# Patient Record
Sex: MALE | Race: WHITE | Employment: FULL TIME | URBAN - METROPOLITAN AREA
[De-identification: names, ages, dates, MRNs, and addresses within clinical notes are randomized per-mention and may not be internally consistent; named-entity substitution may affect disease eponyms.]

---

## 2020-02-21 ENCOUNTER — APPOINTMENT (OUTPATIENT)
Dept: CT IMAGING | Age: 30
End: 2020-02-21
Payer: COMMERCIAL

## 2020-02-21 ENCOUNTER — APPOINTMENT (OUTPATIENT)
Dept: GENERAL RADIOLOGY | Age: 30
End: 2020-02-21
Payer: COMMERCIAL

## 2020-02-21 ENCOUNTER — HOSPITAL ENCOUNTER (EMERGENCY)
Age: 30
Discharge: HOME | End: 2020-02-21
Attending: EMERGENCY MEDICINE
Payer: COMMERCIAL

## 2020-02-21 VITALS
HEIGHT: 70 IN | TEMPERATURE: 98.2 F | WEIGHT: 180 LBS | RESPIRATION RATE: 19 BRPM | HEART RATE: 119 BPM | OXYGEN SATURATION: 93 % | BODY MASS INDEX: 25.77 KG/M2 | SYSTOLIC BLOOD PRESSURE: 135 MMHG | DIASTOLIC BLOOD PRESSURE: 99 MMHG

## 2020-02-21 LAB
A/G RATIO: 1.7 (ref 1.1–2.2)
ALBUMIN SERPL-MCNC: 5.2 G/DL (ref 3.4–5)
ALP BLD-CCNC: 86 U/L (ref 40–129)
ALT SERPL-CCNC: 75 U/L (ref 10–40)
AMPHETAMINE SCREEN, URINE: NORMAL
ANION GAP SERPL CALCULATED.3IONS-SCNC: 23 MMOL/L (ref 3–16)
AST SERPL-CCNC: 74 U/L (ref 15–37)
BARBITURATE SCREEN URINE: NORMAL
BASOPHILS ABSOLUTE: 0 K/UL (ref 0–0.2)
BASOPHILS RELATIVE PERCENT: 0.8 %
BENZODIAZEPINE SCREEN, URINE: NORMAL
BILIRUB SERPL-MCNC: 0.5 MG/DL (ref 0–1)
BILIRUBIN URINE: NEGATIVE
BLOOD, URINE: NEGATIVE
BUN BLDV-MCNC: 9 MG/DL (ref 7–20)
CALCIUM SERPL-MCNC: 9.1 MG/DL (ref 8.3–10.6)
CANNABINOID SCREEN URINE: NORMAL
CHLORIDE BLD-SCNC: 93 MMOL/L (ref 99–110)
CLARITY: CLEAR
CO2: 22 MMOL/L (ref 21–32)
COCAINE METABOLITE SCREEN URINE: NORMAL
COLOR: ABNORMAL
CREAT SERPL-MCNC: 0.7 MG/DL (ref 0.9–1.3)
D DIMER: 309 NG/ML DDU (ref 0–229)
EKG ATRIAL RATE: 109 BPM
EKG DIAGNOSIS: NORMAL
EKG P AXIS: 45 DEGREES
EKG P-R INTERVAL: 158 MS
EKG Q-T INTERVAL: 352 MS
EKG QRS DURATION: 92 MS
EKG QTC CALCULATION (BAZETT): 474 MS
EKG R AXIS: 11 DEGREES
EKG T AXIS: 19 DEGREES
EKG VENTRICULAR RATE: 109 BPM
EOSINOPHILS ABSOLUTE: 0 K/UL (ref 0–0.6)
EOSINOPHILS RELATIVE PERCENT: 0.6 %
GFR AFRICAN AMERICAN: >60
GFR NON-AFRICAN AMERICAN: >60
GLOBULIN: 3.1 G/DL
GLUCOSE BLD-MCNC: 92 MG/DL (ref 70–99)
GLUCOSE URINE: NEGATIVE MG/DL
HCT VFR BLD CALC: 46.9 % (ref 40.5–52.5)
HEMOGLOBIN: 16 G/DL (ref 13.5–17.5)
KETONES, URINE: 15 MG/DL
LEUKOCYTE ESTERASE, URINE: NEGATIVE
LIPASE: 23 U/L (ref 13–60)
LYMPHOCYTES ABSOLUTE: 1.5 K/UL (ref 1–5.1)
LYMPHOCYTES RELATIVE PERCENT: 25.3 %
Lab: NORMAL
MAGNESIUM: 2.1 MG/DL (ref 1.8–2.4)
MCH RBC QN AUTO: 31 PG (ref 26–34)
MCHC RBC AUTO-ENTMCNC: 34.2 G/DL (ref 31–36)
MCV RBC AUTO: 90.6 FL (ref 80–100)
METHADONE SCREEN, URINE: NORMAL
MICROSCOPIC EXAMINATION: ABNORMAL
MONOCYTES ABSOLUTE: 0.2 K/UL (ref 0–1.3)
MONOCYTES RELATIVE PERCENT: 3.7 %
NEUTROPHILS ABSOLUTE: 4.1 K/UL (ref 1.7–7.7)
NEUTROPHILS RELATIVE PERCENT: 69.6 %
NITRITE, URINE: NEGATIVE
OPIATE SCREEN URINE: NORMAL
OXYCODONE URINE: NORMAL
PDW BLD-RTO: 13.1 % (ref 12.4–15.4)
PH UA: 6
PH UA: 6 (ref 5–8)
PHENCYCLIDINE SCREEN URINE: NORMAL
PLATELET # BLD: 274 K/UL (ref 135–450)
PMV BLD AUTO: 6.8 FL (ref 5–10.5)
POTASSIUM SERPL-SCNC: 4.4 MMOL/L (ref 3.5–5.1)
PROPOXYPHENE SCREEN: NORMAL
PROTEIN UA: NEGATIVE MG/DL
RAPID INFLUENZA  B AGN: NEGATIVE
RAPID INFLUENZA A AGN: NEGATIVE
RBC # BLD: 5.17 M/UL (ref 4.2–5.9)
SODIUM BLD-SCNC: 138 MMOL/L (ref 136–145)
SPECIFIC GRAVITY UA: <=1.005 (ref 1–1.03)
TOTAL PROTEIN: 8.3 G/DL (ref 6.4–8.2)
TROPONIN: <0.01 NG/ML
TSH REFLEX: 1.97 UIU/ML (ref 0.27–4.2)
URINE TYPE: ABNORMAL
UROBILINOGEN, URINE: 0.2 E.U./DL
WBC # BLD: 5.9 K/UL (ref 4–11)

## 2020-02-21 PROCEDURE — 2580000003 HC RX 258: Performed by: EMERGENCY MEDICINE

## 2020-02-21 PROCEDURE — 87804 INFLUENZA ASSAY W/OPTIC: CPT

## 2020-02-21 PROCEDURE — 99284 EMERGENCY DEPT VISIT MOD MDM: CPT

## 2020-02-21 PROCEDURE — 96361 HYDRATE IV INFUSION ADD-ON: CPT

## 2020-02-21 PROCEDURE — 83735 ASSAY OF MAGNESIUM: CPT

## 2020-02-21 PROCEDURE — 93005 ELECTROCARDIOGRAM TRACING: CPT | Performed by: EMERGENCY MEDICINE

## 2020-02-21 PROCEDURE — 83690 ASSAY OF LIPASE: CPT

## 2020-02-21 PROCEDURE — 6360000004 HC RX CONTRAST MEDICATION: Performed by: PHYSICIAN ASSISTANT

## 2020-02-21 PROCEDURE — 2580000003 HC RX 258: Performed by: PHYSICIAN ASSISTANT

## 2020-02-21 PROCEDURE — 71260 CT THORAX DX C+: CPT

## 2020-02-21 PROCEDURE — 85379 FIBRIN DEGRADATION QUANT: CPT

## 2020-02-21 PROCEDURE — 80053 COMPREHEN METABOLIC PANEL: CPT

## 2020-02-21 PROCEDURE — 96376 TX/PRO/DX INJ SAME DRUG ADON: CPT

## 2020-02-21 PROCEDURE — 36415 COLL VENOUS BLD VENIPUNCTURE: CPT

## 2020-02-21 PROCEDURE — 96375 TX/PRO/DX INJ NEW DRUG ADDON: CPT

## 2020-02-21 PROCEDURE — 81003 URINALYSIS AUTO W/O SCOPE: CPT

## 2020-02-21 PROCEDURE — 84484 ASSAY OF TROPONIN QUANT: CPT

## 2020-02-21 PROCEDURE — 96374 THER/PROPH/DIAG INJ IV PUSH: CPT

## 2020-02-21 PROCEDURE — 80307 DRUG TEST PRSMV CHEM ANLYZR: CPT

## 2020-02-21 PROCEDURE — 6360000002 HC RX W HCPCS: Performed by: PHYSICIAN ASSISTANT

## 2020-02-21 PROCEDURE — 85025 COMPLETE CBC W/AUTO DIFF WBC: CPT

## 2020-02-21 PROCEDURE — 6360000002 HC RX W HCPCS: Performed by: EMERGENCY MEDICINE

## 2020-02-21 PROCEDURE — 84443 ASSAY THYROID STIM HORMONE: CPT

## 2020-02-21 RX ORDER — DIPHENHYDRAMINE HYDROCHLORIDE 50 MG/ML
50 INJECTION INTRAMUSCULAR; INTRAVENOUS ONCE
Status: COMPLETED | OUTPATIENT
Start: 2020-02-21 | End: 2020-02-21

## 2020-02-21 RX ORDER — 0.9 % SODIUM CHLORIDE 0.9 %
1000 INTRAVENOUS SOLUTION INTRAVENOUS ONCE
Status: COMPLETED | OUTPATIENT
Start: 2020-02-21 | End: 2020-02-21

## 2020-02-21 RX ORDER — ONDANSETRON 2 MG/ML
4 INJECTION INTRAMUSCULAR; INTRAVENOUS ONCE
Status: COMPLETED | OUTPATIENT
Start: 2020-02-21 | End: 2020-02-21

## 2020-02-21 RX ORDER — METOCLOPRAMIDE HYDROCHLORIDE 5 MG/ML
10 INJECTION INTRAMUSCULAR; INTRAVENOUS ONCE
Status: COMPLETED | OUTPATIENT
Start: 2020-02-21 | End: 2020-02-21

## 2020-02-21 RX ORDER — KETOROLAC TROMETHAMINE 30 MG/ML
30 INJECTION, SOLUTION INTRAMUSCULAR; INTRAVENOUS ONCE
Status: COMPLETED | OUTPATIENT
Start: 2020-02-21 | End: 2020-02-21

## 2020-02-21 RX ORDER — LORAZEPAM 2 MG/ML
1 INJECTION INTRAMUSCULAR ONCE
Status: COMPLETED | OUTPATIENT
Start: 2020-02-21 | End: 2020-02-21

## 2020-02-21 RX ORDER — OMEPRAZOLE 20 MG/1
20 CAPSULE, DELAYED RELEASE ORAL DAILY
Status: ON HOLD | COMMUNITY
End: 2021-03-28

## 2020-02-21 RX ORDER — SODIUM CHLORIDE, SODIUM LACTATE, POTASSIUM CHLORIDE, CALCIUM CHLORIDE 600; 310; 30; 20 MG/100ML; MG/100ML; MG/100ML; MG/100ML
1000 INJECTION, SOLUTION INTRAVENOUS ONCE
Status: COMPLETED | OUTPATIENT
Start: 2020-02-21 | End: 2020-02-21

## 2020-02-21 RX ADMIN — SODIUM CHLORIDE 1000 ML: 9 INJECTION, SOLUTION INTRAVENOUS at 13:46

## 2020-02-21 RX ADMIN — METOCLOPRAMIDE HYDROCHLORIDE 10 MG: 5 INJECTION INTRAMUSCULAR; INTRAVENOUS at 15:46

## 2020-02-21 RX ADMIN — SODIUM CHLORIDE, POTASSIUM CHLORIDE, SODIUM LACTATE AND CALCIUM CHLORIDE 1000 ML: 600; 310; 30; 20 INJECTION, SOLUTION INTRAVENOUS at 19:42

## 2020-02-21 RX ADMIN — LORAZEPAM 1 MG: 2 INJECTION, SOLUTION INTRAMUSCULAR; INTRAVENOUS at 17:02

## 2020-02-21 RX ADMIN — LORAZEPAM 1 MG: 2 INJECTION, SOLUTION INTRAMUSCULAR; INTRAVENOUS at 19:09

## 2020-02-21 RX ADMIN — ONDANSETRON 4 MG: 2 INJECTION INTRAMUSCULAR; INTRAVENOUS at 13:46

## 2020-02-21 RX ADMIN — DIPHENHYDRAMINE HYDROCHLORIDE 50 MG: 50 INJECTION, SOLUTION INTRAMUSCULAR; INTRAVENOUS at 15:46

## 2020-02-21 RX ADMIN — IOPAMIDOL 150 ML: 755 INJECTION, SOLUTION INTRAVENOUS at 18:39

## 2020-02-21 RX ADMIN — SODIUM CHLORIDE 1000 ML: 9 INJECTION, SOLUTION INTRAVENOUS at 14:47

## 2020-02-21 RX ADMIN — KETOROLAC TROMETHAMINE 30 MG: 30 INJECTION, SOLUTION INTRAMUSCULAR at 15:46

## 2020-02-21 ASSESSMENT — PAIN DESCRIPTION - LOCATION: LOCATION: HEAD

## 2020-02-21 ASSESSMENT — PAIN DESCRIPTION - DESCRIPTORS: DESCRIPTORS: HEAVINESS

## 2020-02-21 ASSESSMENT — PAIN DESCRIPTION - PAIN TYPE: TYPE: ACUTE PAIN

## 2020-02-21 ASSESSMENT — PAIN SCALES - GENERAL
PAINLEVEL_OUTOF10: 7
PAINLEVEL_OUTOF10: 7

## 2020-02-22 NOTE — ED PROVIDER NOTES
[facility] Emergency Department    CHIEF COMPLAINT  Emesis (monday felt weird, tues started with migraine (hx couple migraine per year), vomiting since tuesday. went to urgent care today; HR was in low 100's and told to go to ED. was given 25mg IM phenergan at urgent care approx 30 min ago) and Headache (head just feels heavy now. feels anxious. )      HISTORY OF PRESENT ILLNESS  Guanakito Knowles is a 34 y.o. male who presents to the ED complaining of several day history of multiple complaints. Accompanied by wife today for evaluation. Patient reports starting with some nausea on Monday. Began with headache reports generalized on Tuesday. History of 1-2 migraines a year and states that this felt very consistent. Body aches and anxiety. Despite headaches has had no dizziness or confusion. Does occasionally feel lightheaded. No visual changes or disturbances. No difficulty speaking or swallowing. No nasal congestion or sore throat. Denies cough or congestion. No fevers chills. Non-smoker. No rashes. No neck, arm, jaw or back pain. No numbness, tingling, weakness of the extremities. Has had no urinary symptoms. No diarrhea or constipation. No leg pain or swelling. No recent travel, trauma or surgery. Seen at urgent care and recommended for ER evaluation given elevated heart rate. Again no pleuritic chest pain. He denies drug use. Does report drinking 2-3 bourbons daily. Has not had any alcoholic beverages since Monday. No other complaints, modifying factors or associated symptoms. Nursing notes reviewed. Past Medical History:   Diagnosis Date    GERD (gastroesophageal reflux disease)      Past Surgical History:   Procedure Laterality Date    SHOULDER SURGERY Right      History reviewed. No pertinent family history.   Social History     Socioeconomic History    Marital status:      Spouse name: Not on file    Number of children: Not on file    membranes are moist.   NECK: Supple. No JVD. No tracheal tenderness or deviation. No crepitus. No meningismus. HEART: Tachycardic. No murmurs. No chest wall tenderness. LUNGS: Respirations unlabored. CTAB. Good air exchange. Speaking comfortably in full sentences. No wheezing, rhonchi, rales. ABDOMEN: Soft. Non-distended. Non-tender. No guarding or rebound. Negative Zee's, McBurney's, and Rovsing's. No fluid waves or ascites. No hernias or masses. Bowel sounds normal in all quadrants. No CVA tenderness. EXTREMITIES: No peripheral edema. Moves all extremities equally. All extremities neurovascularly intact. SKIN: Warm and dry. No acute rashes. NEUROLOGICAL: Alert and oriented. CN's 2-12 intact. No gross facial drooping. Strength 5/5, sensation intact. PSYCHIATRIC: Normal mood and affect. RADIOLOGY  Ct Chest Pulmonary Embolism W Contrast Pending    ED COURSE/MDM/DISPOSITION   I have evaluated this patient in collaboration with Dr. Chikis Baker. Patient received Zofran IV for nausea, with good relief. Triage vitals with tachycardia pulse rate 139. Remainder vitals stable. Given a 1 L IV fluid bolus. Rapid flu negative. Urinalysis with ketonuria. Urine drug screen negative. CBC without leukocytosis or anemia. CMP fairly unremarkable. Lipase normal.  Magnesium 2.1. Troponin less than 0.01. On reevaluation patient remains tachycardic and uncomfortable. States that he is feeling anxious. Does still have mild headache. Attempted Benadryl, Reglan and Toradol as well as a dose of Ativan and an additional 1 L IV fluid bolus. Despite this remains tachycardic. Patient did have elevated d-dimer at 309. CT chest pending to evaluate for PE. Please refer to Dr. Ivonne Linares documentation regarding ED course, evaluation, treatment, medical decision making, and disposition for this patient. Final Impression  1. Palpitations    2.  Sinus tachycardia           Naila Ingram  02/22/20 [address]

## 2020-02-22 NOTE — ED NOTES
--Patient provided with discharge instructions. --Instructions and follow-up appointments reviewed with patient/family. No further questions or needs at this time. --Vital signs and patient stable upon discharge. --Patient ambulatory to [facility].         Puneet Burns RN  02/21/20 1166

## 2021-03-27 ENCOUNTER — APPOINTMENT (OUTPATIENT)
Dept: GENERAL RADIOLOGY | Age: 31
DRG: 439 | End: 2021-03-27
Payer: COMMERCIAL

## 2021-03-27 ENCOUNTER — APPOINTMENT (OUTPATIENT)
Dept: CT IMAGING | Age: 31
DRG: 439 | End: 2021-03-27
Payer: COMMERCIAL

## 2021-03-27 ENCOUNTER — HOSPITAL ENCOUNTER (INPATIENT)
Age: 31
LOS: 3 days | Discharge: HOME | DRG: 439 | End: 2021-03-30
Attending: EMERGENCY MEDICINE | Admitting: INTERNAL MEDICINE
Payer: COMMERCIAL

## 2021-03-27 DIAGNOSIS — F10.929 ACUTE ALCOHOLIC INTOXICATION WITH COMPLICATION (HCC): Primary | ICD-10-CM

## 2021-03-27 DIAGNOSIS — S22.31XA CLOSED FRACTURE OF ONE RIB OF RIGHT SIDE, INITIAL ENCOUNTER: ICD-10-CM

## 2021-03-27 DIAGNOSIS — E87.6 HYPOKALEMIA: ICD-10-CM

## 2021-03-27 DIAGNOSIS — S02.2XXA CLOSED FRACTURE OF NASAL BONE, INITIAL ENCOUNTER: ICD-10-CM

## 2021-03-27 DIAGNOSIS — K85.20 ALCOHOL-INDUCED ACUTE PANCREATITIS, UNSPECIFIED COMPLICATION STATUS: ICD-10-CM

## 2021-03-27 DIAGNOSIS — S27.321A CONTUSION OF RIGHT LUNG, INITIAL ENCOUNTER: ICD-10-CM

## 2021-03-27 PROBLEM — K85.90 ACUTE PANCREATITIS WITHOUT INFECTION OR NECROSIS: Status: ACTIVE | Noted: 2021-03-27

## 2021-03-27 PROBLEM — K20.90 ESOPHAGITIS: Status: ACTIVE | Noted: 2021-03-27

## 2021-03-27 PROBLEM — R74.8 ELEVATED LIVER ENZYMES: Status: ACTIVE | Noted: 2021-03-27

## 2021-03-27 PROBLEM — S27.329A PULMONARY CONTUSION: Status: ACTIVE | Noted: 2021-03-27

## 2021-03-27 LAB
A/G RATIO: 1.9 (ref 1.1–2.2)
ALBUMIN SERPL-MCNC: 5.2 G/DL (ref 3.4–5)
ALP BLD-CCNC: 146 U/L (ref 40–129)
ALT SERPL-CCNC: 195 U/L (ref 10–40)
AMORPHOUS: ABNORMAL /HPF
AMPHETAMINE SCREEN, URINE: NORMAL
ANION GAP SERPL CALCULATED.3IONS-SCNC: 30 MMOL/L (ref 3–16)
AST SERPL-CCNC: 255 U/L (ref 15–37)
BACTERIA: ABNORMAL /HPF
BARBITURATE SCREEN URINE: NORMAL
BASE EXCESS VENOUS: -1 MMOL/L (ref -3–3)
BASOPHILS ABSOLUTE: 0 K/UL (ref 0–0.2)
BASOPHILS RELATIVE PERCENT: 0.3 %
BENZODIAZEPINE SCREEN, URINE: NORMAL
BILIRUB SERPL-MCNC: 0.6 MG/DL (ref 0–1)
BILIRUBIN URINE: NEGATIVE
BLOOD, URINE: ABNORMAL
BUN BLDV-MCNC: 20 MG/DL (ref 7–20)
CALCIUM SERPL-MCNC: 9.3 MG/DL (ref 8.3–10.6)
CANNABINOID SCREEN URINE: NORMAL
CARBOXYHEMOGLOBIN: 2.8 % (ref 0–1.5)
CHLORIDE BLD-SCNC: 78 MMOL/L (ref 99–110)
CLARITY: CLEAR
CO2: 22 MMOL/L (ref 21–32)
COCAINE METABOLITE SCREEN URINE: NORMAL
COLOR: YELLOW
CREAT SERPL-MCNC: 0.9 MG/DL (ref 0.9–1.3)
EOSINOPHILS ABSOLUTE: 0 K/UL (ref 0–0.6)
EOSINOPHILS RELATIVE PERCENT: 0 %
EPITHELIAL CELLS, UA: ABNORMAL /HPF (ref 0–5)
ETHANOL: 454 MG/DL (ref 0–0.08)
GFR AFRICAN AMERICAN: >60
GFR NON-AFRICAN AMERICAN: >60
GLOBULIN: 2.8 G/DL
GLUCOSE BLD-MCNC: 140 MG/DL (ref 70–99)
GLUCOSE BLD-MCNC: 159 MG/DL (ref 70–99)
GLUCOSE URINE: NEGATIVE MG/DL
HCO3 VENOUS: 20.9 MMOL/L (ref 23–29)
HCT VFR BLD CALC: 45.1 % (ref 40.5–52.5)
HEMOGLOBIN: 15.5 G/DL (ref 13.5–17.5)
KETONES, URINE: 40 MG/DL
LEUKOCYTE ESTERASE, URINE: NEGATIVE
LIPASE: 463 U/L (ref 13–60)
LYMPHOCYTES ABSOLUTE: 0.7 K/UL (ref 1–5.1)
LYMPHOCYTES RELATIVE PERCENT: 4.1 %
Lab: NORMAL
MAGNESIUM: 3 MG/DL (ref 1.8–2.4)
MCH RBC QN AUTO: 31.3 PG (ref 26–34)
MCHC RBC AUTO-ENTMCNC: 34.3 G/DL (ref 31–36)
MCV RBC AUTO: 91.1 FL (ref 80–100)
METHADONE SCREEN, URINE: NORMAL
METHEMOGLOBIN VENOUS: 0.4 %
MICROSCOPIC EXAMINATION: YES
MONOCYTES ABSOLUTE: 1.1 K/UL (ref 0–1.3)
MONOCYTES RELATIVE PERCENT: 6 %
MUCUS: ABNORMAL /LPF
NEUTROPHILS ABSOLUTE: 16.1 K/UL (ref 1.7–7.7)
NEUTROPHILS RELATIVE PERCENT: 89.6 %
NITRITE, URINE: NEGATIVE
O2 CONTENT, VEN: 18 VOL %
O2 SAT, VEN: 79 %
O2 THERAPY: ABNORMAL
OPIATE SCREEN URINE: NORMAL
OXYCODONE URINE: NORMAL
PCO2, VEN: 28.7 MMHG (ref 40–50)
PDW BLD-RTO: 14.2 % (ref 12.4–15.4)
PERFORMED ON: ABNORMAL
PH UA: 6
PH UA: 6 (ref 5–8)
PH VENOUS: 7.48 (ref 7.35–7.45)
PHENCYCLIDINE SCREEN URINE: NORMAL
PLATELET # BLD: 366 K/UL (ref 135–450)
PMV BLD AUTO: 6.7 FL (ref 5–10.5)
PO2, VEN: 46.8 MMHG (ref 25–40)
POTASSIUM REFLEX MAGNESIUM: 3 MMOL/L (ref 3.5–5.1)
PROPOXYPHENE SCREEN: NORMAL
PROTEIN UA: >=300 MG/DL
RBC # BLD: 4.94 M/UL (ref 4.2–5.9)
RBC UA: ABNORMAL /HPF (ref 0–4)
SODIUM BLD-SCNC: 130 MMOL/L (ref 136–145)
SPECIFIC GRAVITY UA: >=1.03 (ref 1–1.03)
SPECIMEN STATUS: NORMAL
TCO2 CALC VENOUS: 22 MMOL/L
TOTAL PROTEIN: 8 G/DL (ref 6.4–8.2)
URINE TYPE: ABNORMAL
UROBILINOGEN, URINE: 0.2 E.U./DL
WBC # BLD: 17.9 K/UL (ref 4–11)
WBC UA: ABNORMAL /HPF (ref 0–5)

## 2021-03-27 PROCEDURE — 85025 COMPLETE CBC W/AUTO DIFF WBC: CPT

## 2021-03-27 PROCEDURE — 2580000003 HC RX 258: Performed by: INTERNAL MEDICINE

## 2021-03-27 PROCEDURE — 96376 TX/PRO/DX INJ SAME DRUG ADON: CPT

## 2021-03-27 PROCEDURE — 70486 CT MAXILLOFACIAL W/O DYE: CPT

## 2021-03-27 PROCEDURE — C9113 INJ PANTOPRAZOLE SODIUM, VIA: HCPCS | Performed by: INTERNAL MEDICINE

## 2021-03-27 PROCEDURE — 6360000002 HC RX W HCPCS: Performed by: EMERGENCY MEDICINE

## 2021-03-27 PROCEDURE — 6360000002 HC RX W HCPCS: Performed by: INTERNAL MEDICINE

## 2021-03-27 PROCEDURE — 83735 ASSAY OF MAGNESIUM: CPT

## 2021-03-27 PROCEDURE — 82803 BLOOD GASES ANY COMBINATION: CPT

## 2021-03-27 PROCEDURE — 71045 X-RAY EXAM CHEST 1 VIEW: CPT

## 2021-03-27 PROCEDURE — 80307 DRUG TEST PRSMV CHEM ANLYZR: CPT

## 2021-03-27 PROCEDURE — 2580000003 HC RX 258: Performed by: EMERGENCY MEDICINE

## 2021-03-27 PROCEDURE — 74177 CT ABD & PELVIS W/CONTRAST: CPT

## 2021-03-27 PROCEDURE — 83690 ASSAY OF LIPASE: CPT

## 2021-03-27 PROCEDURE — 82077 ASSAY SPEC XCP UR&BREATH IA: CPT

## 2021-03-27 PROCEDURE — 81001 URINALYSIS AUTO W/SCOPE: CPT

## 2021-03-27 PROCEDURE — 70450 CT HEAD/BRAIN W/O DYE: CPT

## 2021-03-27 PROCEDURE — 93005 ELECTROCARDIOGRAM TRACING: CPT | Performed by: EMERGENCY MEDICINE

## 2021-03-27 PROCEDURE — 96374 THER/PROPH/DIAG INJ IV PUSH: CPT

## 2021-03-27 PROCEDURE — 6360000004 HC RX CONTRAST MEDICATION: Performed by: EMERGENCY MEDICINE

## 2021-03-27 PROCEDURE — 80053 COMPREHEN METABOLIC PANEL: CPT

## 2021-03-27 PROCEDURE — 1200000000 HC SEMI PRIVATE

## 2021-03-27 PROCEDURE — 99284 EMERGENCY DEPT VISIT MOD MDM: CPT

## 2021-03-27 PROCEDURE — 96375 TX/PRO/DX INJ NEW DRUG ADDON: CPT

## 2021-03-27 RX ORDER — 0.9 % SODIUM CHLORIDE 0.9 %
1000 INTRAVENOUS SOLUTION INTRAVENOUS ONCE
Status: COMPLETED | OUTPATIENT
Start: 2021-03-27 | End: 2021-03-27

## 2021-03-27 RX ORDER — MORPHINE SULFATE 4 MG/ML
4 INJECTION, SOLUTION INTRAMUSCULAR; INTRAVENOUS
Status: DISCONTINUED | OUTPATIENT
Start: 2021-03-27 | End: 2021-03-29

## 2021-03-27 RX ORDER — SODIUM CHLORIDE 9 MG/ML
25 INJECTION, SOLUTION INTRAVENOUS PRN
Status: DISCONTINUED | OUTPATIENT
Start: 2021-03-27 | End: 2021-03-30 | Stop reason: HOSPADM

## 2021-03-27 RX ORDER — 0.9 % SODIUM CHLORIDE 0.9 %
2000 INTRAVENOUS SOLUTION INTRAVENOUS ONCE
Status: COMPLETED | OUTPATIENT
Start: 2021-03-27 | End: 2021-03-27

## 2021-03-27 RX ORDER — SODIUM CHLORIDE, SODIUM LACTATE, POTASSIUM CHLORIDE, CALCIUM CHLORIDE 600; 310; 30; 20 MG/100ML; MG/100ML; MG/100ML; MG/100ML
INJECTION, SOLUTION INTRAVENOUS CONTINUOUS
Status: DISCONTINUED | OUTPATIENT
Start: 2021-03-28 | End: 2021-03-30 | Stop reason: HOSPADM

## 2021-03-27 RX ORDER — ONDANSETRON 2 MG/ML
4 INJECTION INTRAMUSCULAR; INTRAVENOUS ONCE
Status: COMPLETED | OUTPATIENT
Start: 2021-03-27 | End: 2021-03-27

## 2021-03-27 RX ORDER — ONDANSETRON 2 MG/ML
4 INJECTION INTRAMUSCULAR; INTRAVENOUS EVERY 6 HOURS PRN
Status: DISCONTINUED | OUTPATIENT
Start: 2021-03-27 | End: 2021-03-30 | Stop reason: HOSPADM

## 2021-03-27 RX ORDER — PANTOPRAZOLE SODIUM 40 MG/10ML
40 INJECTION, POWDER, LYOPHILIZED, FOR SOLUTION INTRAVENOUS DAILY
Status: DISCONTINUED | OUTPATIENT
Start: 2021-03-27 | End: 2021-03-30 | Stop reason: HOSPADM

## 2021-03-27 RX ORDER — PROMETHAZINE HYDROCHLORIDE 25 MG/1
12.5 TABLET ORAL EVERY 6 HOURS PRN
Status: DISCONTINUED | OUTPATIENT
Start: 2021-03-27 | End: 2021-03-29

## 2021-03-27 RX ORDER — THIAMINE HYDROCHLORIDE 100 MG/ML
100 INJECTION, SOLUTION INTRAMUSCULAR; INTRAVENOUS DAILY
Status: DISCONTINUED | OUTPATIENT
Start: 2021-03-27 | End: 2021-03-30 | Stop reason: HOSPADM

## 2021-03-27 RX ORDER — MORPHINE SULFATE 2 MG/ML
2 INJECTION, SOLUTION INTRAMUSCULAR; INTRAVENOUS
Status: DISCONTINUED | OUTPATIENT
Start: 2021-03-27 | End: 2021-03-29

## 2021-03-27 RX ORDER — POTASSIUM CHLORIDE 7.45 MG/ML
10 INJECTION INTRAVENOUS ONCE
Status: COMPLETED | OUTPATIENT
Start: 2021-03-27 | End: 2021-03-27

## 2021-03-27 RX ORDER — SODIUM CHLORIDE, SODIUM LACTATE, POTASSIUM CHLORIDE, CALCIUM CHLORIDE 600; 310; 30; 20 MG/100ML; MG/100ML; MG/100ML; MG/100ML
INJECTION, SOLUTION INTRAVENOUS CONTINUOUS
Status: ACTIVE | OUTPATIENT
Start: 2021-03-27 | End: 2021-03-28

## 2021-03-27 RX ORDER — SODIUM CHLORIDE 0.9 % (FLUSH) 0.9 %
10 SYRINGE (ML) INJECTION EVERY 12 HOURS SCHEDULED
Status: DISCONTINUED | OUTPATIENT
Start: 2021-03-27 | End: 2021-03-30 | Stop reason: HOSPADM

## 2021-03-27 RX ORDER — SODIUM CHLORIDE 0.9 % (FLUSH) 0.9 %
10 SYRINGE (ML) INJECTION PRN
Status: DISCONTINUED | OUTPATIENT
Start: 2021-03-27 | End: 2021-03-30 | Stop reason: HOSPADM

## 2021-03-27 RX ORDER — ACETAMINOPHEN 325 MG/1
650 TABLET ORAL EVERY 4 HOURS PRN
Status: DISCONTINUED | OUTPATIENT
Start: 2021-03-27 | End: 2021-03-30 | Stop reason: HOSPADM

## 2021-03-27 RX ADMIN — POTASSIUM CHLORIDE 10 MEQ: 7.46 INJECTION, SOLUTION INTRAVENOUS at 19:15

## 2021-03-27 RX ADMIN — ONDANSETRON 4 MG: 2 INJECTION INTRAMUSCULAR; INTRAVENOUS at 18:06

## 2021-03-27 RX ADMIN — THIAMINE HYDROCHLORIDE 100 MG: 100 INJECTION, SOLUTION INTRAMUSCULAR; INTRAVENOUS at 21:01

## 2021-03-27 RX ADMIN — PANTOPRAZOLE SODIUM 40 MG: 40 INJECTION, POWDER, FOR SOLUTION INTRAVENOUS at 21:01

## 2021-03-27 RX ADMIN — IOPAMIDOL 75 ML: 755 INJECTION, SOLUTION INTRAVENOUS at 19:29

## 2021-03-27 RX ADMIN — SODIUM CHLORIDE 1000 ML: 9 INJECTION, SOLUTION INTRAVENOUS at 17:19

## 2021-03-27 RX ADMIN — SODIUM CHLORIDE, POTASSIUM CHLORIDE, SODIUM LACTATE AND CALCIUM CHLORIDE: 600; 310; 30; 20 INJECTION, SOLUTION INTRAVENOUS at 21:52

## 2021-03-27 RX ADMIN — SODIUM CHLORIDE 2000 ML: 9 INJECTION, SOLUTION INTRAVENOUS at 21:02

## 2021-03-27 RX ADMIN — POTASSIUM CHLORIDE 10 MEQ: 7.46 INJECTION, SOLUTION INTRAVENOUS at 18:29

## 2021-03-27 ASSESSMENT — PAIN SCALES - GENERAL: PAINLEVEL_OUTOF10: 0

## 2021-03-27 NOTE — ED PROVIDER NOTES
CHIEF COMPLAINT  Alcohol Intoxication (EMS was called due to pt being intoxicated. Pt is from home. Eyes open upon arrival. )      HISTORY OF PRESENT ILLNESS  Ziggy Moreno is a 27 y.o. male with a history of alcoholism and GERD who presents to the ED via EMS for evaluation of severe alcohol intoxication. Patient reportedly drinking heavily recently. He is extremely intoxicated and a very limited historian. He does recall falling down some stairs yesterday resulting in some nasal pain. Is uncertain about loss of consciousness. No report of drug abuse. He does report some upper abdominal pain. No other complaints, modifying factors or associated symptoms. I have reviewed the following from the nursing documentation. Past Medical History:   Diagnosis Date    GERD (gastroesophageal reflux disease)      Past Surgical History:   Procedure Laterality Date    SHOULDER SURGERY Right      History reviewed. No pertinent family history. Social History     Socioeconomic History    Marital status:      Spouse name: Not on file    Number of children: Not on file    Years of education: Not on file    Highest education level: Not on file   Occupational History    Not on file   Social Needs    Financial resource strain: Not on file    Food insecurity     Worry: Not on file     Inability: Not on file    Transportation needs     Medical: Not on file     Non-medical: Not on file   Tobacco Use    Smoking status: Never Smoker    Smokeless tobacco: Never Used   Substance and Sexual Activity    Alcohol use:  Yes    Drug use: Never    Sexual activity: Not on file   Lifestyle    Physical activity     Days per week: Not on file     Minutes per session: Not on file    Stress: Not on file   Relationships    Social connections     Talks on phone: Not on file     Gets together: Not on file     Attends [employer] service: Not on file     Active member of club or organization: Not on file     Attends meetings of clubs or organizations: Not on file     Relationship status: Not on file    Intimate partner violence     Fear of current or ex partner: Not on file     Emotionally abused: Not on file     Physically abused: Not on file     Forced sexual activity: Not on file   Other Topics Concern    Not on file   Social History Narrative    Not on file     Current Facility-Administered Medications   Medication Dose Route Frequency Provider Last Rate Last Admin    pantoprazole (PROTONIX) injection 40 mg  40 mg Intravenous Daily Demetrius Biggs MD   40 mg at 03/27/21 2101    thiamine (B-1) injection 100 mg  100 mg Intravenous Daily Demetrius Biggs MD   100 mg at 03/27/21 2101    lactated ringers infusion   Intravenous Continuous Demetrius Biggs  mL/hr at 03/27/21 2152 New Bag at 03/27/21 2152    Followed by   Kit Saltness ON 3/28/2021] lactated ringers infusion   Intravenous Continuous Demetrius Biggs MD        sodium chloride flush 0.9 % injection 10 mL  10 mL Intravenous 2 times per day Demetrius Biggs MD        sodium chloride flush 0.9 % injection 10 mL  10 mL Intravenous PRN Demetrius Biggs MD        0.9 % sodium chloride infusion  25 mL Intravenous PRN Demetrius Biggs MD        acetaminophen (TYLENOL) tablet 650 mg  650 mg Oral Q4H PRN Demetrius Biggs MD        promethazine (PHENERGAN) tablet 12.5 mg  12.5 mg Oral Q6H PRN Demetrius Biggs MD        Or    ondansetron (ZOFRAN) injection 4 mg  4 mg Intravenous Q6H PRN MD Marlin Kang [START ON 3/28/2021] enoxaparin (LOVENOX) injection 40 mg  40 mg Subcutaneous Daily Demetrius Biggs MD        morphine (PF) injection 2 mg  2 mg Intravenous Q2H PRN Demetrius Biggs MD        Or    morphine (PF) injection 4 mg  4 mg Intravenous Q2H PRN Demetrius Biggs MD         Allergies   Allergen Reactions    Tramadol      seizure       REVIEW OF SYSTEMS  10 systems reviewed, pertinent positives per HPI otherwise noted to be negative. PHYSICAL EXAM  BP (!) 154/68   Pulse 112   Temp 98.2 °F (36.8 °C) (Oral)   Resp 19   SpO2 91%   GENERAL APPEARANCE: Appears heavily intoxicated, strong odor of alcohol, unkempt, at times argumentative. HEAD: Normocephalic. EYES: PERRL. EOM's grossly intact. Horizontal nystagmus. Injected conjunctiva. ENT: Mucous membranes are dry. Diffuse nasal swelling with slight ecchymosis over the nasal bridge. NECK: Supple, trachea midline. HEART: Regular rhythm, rate 110. Normal S1S2, no rubs, gallops, or murmurs noted  LUNGS: Respirations unlabored. CTAB. Good air exchange. No wheezes, rales, or rhonchi. Speaking comfortably in full sentences. ABDOMEN: Soft. Non-distended. Mild to moderate epigastric tenderness. No guarding or rebound. Normal bowel sounds. EXTREMITIES: No peripheral edema. MAEE. No acute deformities. SKIN: Warm and dry. No acute rashes. NEUROLOGICAL: Alert and interactive. CN II-XII intact. No gross facial drooping. Strength 5/5, sensation intact. Slurred speech and impaired coordination consistent with alcohol intoxication. PSYCHIATRIC: Agitated and argumentative at times. LABS  I have reviewed all labs for this visit.    Results for orders placed or performed during the hospital encounter of 03/27/21   CBC Auto Differential   Result Value Ref Range    WBC 17.9 (H) 4.0 - 11.0 K/uL    RBC 4.94 4.20 - 5.90 M/uL    Hemoglobin 15.5 13.5 - 17.5 g/dL    Hematocrit 45.1 40.5 - 52.5 %    MCV 91.1 80.0 - 100.0 fL    MCH 31.3 26.0 - 34.0 pg    MCHC 34.3 31.0 - 36.0 g/dL    RDW 14.2 12.4 - 15.4 %    Platelets 898 286 - 994 K/uL    MPV 6.7 5.0 - 10.5 fL    Neutrophils % 89.6 %    Lymphocytes % 4.1 %    Monocytes % 6.0 %    Eosinophils % 0.0 %    Basophils % 0.3 %    Neutrophils Absolute 16.1 (H) 1.7 - 7.7 K/uL    Lymphocytes Absolute 0.7 (L) 1.0 - 5.1 K/uL    Monocytes Absolute 1.1 0.0 - 1.3 K/uL    Eosinophils Absolute 0.0 0.0 - 0.6 K/uL Basophils Absolute 0.0 0.0 - 0.2 K/uL   Comprehensive Metabolic Panel w/ Reflex to MG   Result Value Ref Range    Sodium 130 (L) 136 - 145 mmol/L    Potassium reflex Magnesium 3.0 (L) 3.5 - 5.1 mmol/L    Chloride 78 (L) 99 - 110 mmol/L    CO2 22 21 - 32 mmol/L    Anion Gap 30 (H) 3 - 16    Glucose 159 (H) 70 - 99 mg/dL    BUN 20 7 - 20 mg/dL    CREATININE 0.9 0.9 - 1.3 mg/dL    GFR Non-African American >60 >60    GFR African American >60 >60    Calcium 9.3 8.3 - 10.6 mg/dL    Total Protein 8.0 6.4 - 8.2 g/dL    Albumin 5.2 (H) 3.4 - 5.0 g/dL    Albumin/Globulin Ratio 1.9 1.1 - 2.2    Total Bilirubin 0.6 0.0 - 1.0 mg/dL    Alkaline Phosphatase 146 (H) 40 - 129 U/L     (H) 10 - 40 U/L     (H) 15 - 37 U/L    Globulin 2.8 g/dL   Lipase   Result Value Ref Range    Lipase 463.0 (H) 13.0 - 60.0 U/L   Urinalysis, reflex to microscopic   Result Value Ref Range    Color, UA Yellow Straw/Yellow    Clarity, UA Clear Clear    Glucose, Ur Negative Negative mg/dL    Bilirubin Urine Negative Negative    Ketones, Urine 40 (A) Negative mg/dL    Specific Gravity, UA >=1.030 1.005 - 1.030    Blood, Urine LARGE (A) Negative    pH, UA 6.0 5.0 - 8.0    Protein, UA >=300 (A) Negative mg/dL    Urobilinogen, Urine 0.2 <2.0 E.U./dL    Nitrite, Urine Negative Negative    Leukocyte Esterase, Urine Negative Negative    Microscopic Examination YES     Urine Type NotGiven    Urine Drug Screen   Result Value Ref Range    Amphetamine Screen, Urine Neg Negative <1000ng/mL    Barbiturate Screen, Ur Neg Negative <200 ng/mL    Benzodiazepine Screen, Urine Neg Negative <200 ng/mL    Cannabinoid Scrn, Ur Neg Negative <50 ng/mL    Cocaine Metabolite Screen, Urine Neg Negative <300 ng/mL    Opiate Scrn, Ur Neg Negative <300 ng/mL    PCP Screen, Urine Neg Negative <25 ng/mL    Methadone Screen, Urine Neg Negative <300 ng/mL    Propoxyphene Scrn, Ur Neg Negative <300 ng/mL    Oxycodone Urine Neg Negative <100 ng/ml    pH, UA 6.0     Drug Screen Comment: see below    Ethanol   Result Value Ref Range    Ethanol Lvl 454 mg/dL   Blood Gas, Venous   Result Value Ref Range    pH, Paulino 7.480 (H) 7.350 - 7.450    pCO2, Paulino 28.7 (L) 40.0 - 50.0 mmHg    pO2, Paulino 46.8 (H) 25 - 40 mmHg    HCO3, Venous 20.9 (L) 23.0 - 29.0 mmol/L    Base Excess, Paulino -1.0 -3.0 - 3.0 mmol/L    O2 Sat, Paulino 79 Not Established %    Carboxyhemoglobin 2.8 (H) 0.0 - 1.5 %    MetHgb, Paulino 0.4 <1.5 %    TC02 (Calc), Paulino 22 Not Established mmol/L    O2 Content, Paulino 18 Not Established VOL %    O2 Therapy Unknown    Magnesium   Result Value Ref Range    Magnesium 3.00 (H) 1.80 - 2.40 mg/dL   Sample possible blood bank testing   Result Value Ref Range    Specimen Status MONI    Microscopic Urinalysis   Result Value Ref Range    Mucus, UA Rare (A) None Seen /LPF    WBC, UA None seen 0 - 5 /HPF    RBC, UA 5-10 (A) 0 - 4 /HPF    Epithelial Cells, UA 2-5 0 - 5 /HPF    Bacteria, UA Rare (A) None Seen /HPF    Amorphous, UA 1+ /HPF   POCT Glucose   Result Value Ref Range    POC Glucose 140 (H) 70 - 99 mg/dl    Performed on ACCU-CHEK        EKG  Sinus tachycardia, rate 116, normal axis, QTC prolonged, no acute ST changes or T wave inversions compared with prior dated 2/21/2020      RADIOLOGY  X-RAYS:  I have reviewed radiologic plain film image(s). ALL OTHER NON-PLAIN FILM IMAGES SUCH AS CT, ULTRASOUND AND MRI HAVE BEEN READ BY THE RADIOLOGIST. CT ABDOMEN PELVIS W IV CONTRAST Additional Contrast? None   Final Result   Peripancreatic fat stranding, compatible with acute pancreatitis. No evidence   of necrosis is seen at this time. Interval development of a lobulated cystic structure measuring 3.1 cm   associated with the pancreatic tail, which likely reflects formation of a   pseudocyst, but serial surveillance is recommended. Marked mural thickening of the distal esophagus, with surrounding fat   stranding, which is new when compared to the previous exam, concerning for   esophagitis.   Again, ulceration is not detected, but that can be difficult to   visualize with CT technique. Consider direct visualization. Severe diffuse hepatic steatosis. CT Head WO Contrast   Final Result   Head CT: No acute intracranial abnormality detected. Facial CT: Bilateral nasal bone fractures with deviation to the left, with   overlying soft tissue swelling. CT FACIAL BONES WO CONTRAST   Final Result   Head CT: No acute intracranial abnormality detected. Facial CT: Bilateral nasal bone fractures with deviation to the left, with   overlying soft tissue swelling. XR CHEST PORTABLE   Final Result   Right lateral 7th rib fracture and adjacent airspace opacity in right lung   probably contusion. US GALLBLADDER RUQ    (Results Pending)              Rechecks: Physical assessment performed. Appears largely unchanged on reevaluation. Intoxicated and unkempt but nontoxic. Critical Care: 30min. Patient with altered mental status, at times combative and argumentative due to severe alcohol intoxication. Requiring frequent reevaluation and intervention including restraints and verbal de-escalation. Electrolyte abnormalities requiring IV potassium replacement. Also with traumatic injuries which required work-up and imaging. Discussion with the admitting physician. ED COURSE/MDM  Patient seen and evaluated. Old records reviewed. Labs and imaging reviewed and results discussed with patient. Patient with severe alcohol intoxication but maintaining his airway. Sherren Barges down some stairs yesterday while intoxicated resulting in nasal fractures and a single right-sided rib fracture with possible pulmonary contusion. Also has upper abdominal discomfort due to apparent alcohol induced acute pancreatitis with likely pseudocyst.  Patient admitted to the hospitalist service. Current Discharge Medication List          CLINICAL IMPRESSION  1.  Acute alcoholic intoxication with complication ([facility] [address]    2. Closed fracture of nasal bone, initial encounter    3. Closed fracture of one rib of right side, initial encounter    4. Contusion of right lung, initial encounter    5. Alcohol-induced acute pancreatitis, unspecified complication status    6. Hypokalemia        Blood pressure (!) 154/68, pulse 112, temperature 98.2 °F (36.8 °C), temperature source Oral, resp. rate 19, SpO2 91 %. DISPOSITION  Morelia Wyatt was admitted in stable condition.       Precious Mace MD  03/27/21 1545

## 2021-03-27 NOTE — ED NOTES
Straight cath performed using sterile technique. 300 ml of urine returned. Urine specimen sent to lab for analysis.      Fernanda Mccann  03/27/21 0204

## 2021-03-27 NOTE — ED NOTES
Patient to end of the stretcher with bilateral arms restrained.  Notified provider     Inge Chavez RN  03/27/21 [id number]

## 2021-03-27 NOTE — ED NOTES
Pt comes to the ED via EMS. Pt's girlfriend called 911 due to pt being intoxicated. Pt's eyes open but not talking upon arrival to the ED. EMS placed IV line in left AC. Pt tachycardic -130 upon arrival to the ED.       Derenda Schwab, RN  03/27/21 9204

## 2021-03-27 NOTE — ED NOTES
Patient climbing to end of the bed, requesting water. Notified provider.  Patient redirected to bed and provided mouth swabs     Luiz Balderas RN  03/27/21 5536

## 2021-03-27 NOTE — ED NOTES

## 2021-03-27 NOTE — ED NOTES
Bed: 01  Expected date:   Expected time:   Means of arrival:   Comments:  Atfd m6     Pérez Tolbert RN  03/27/21 1058

## 2021-03-28 LAB
A/G RATIO: 1.8 (ref 1.1–2.2)
ALBUMIN SERPL-MCNC: 4.2 G/DL (ref 3.4–5)
ALP BLD-CCNC: 102 U/L (ref 40–129)
ALT SERPL-CCNC: 127 U/L (ref 10–40)
ANION GAP SERPL CALCULATED.3IONS-SCNC: 22 MMOL/L (ref 3–16)
AST SERPL-CCNC: 169 U/L (ref 15–37)
BASOPHILS ABSOLUTE: 0 K/UL (ref 0–0.2)
BASOPHILS RELATIVE PERCENT: 0.2 %
BILIRUB SERPL-MCNC: 0.5 MG/DL (ref 0–1)
BUN BLDV-MCNC: 12 MG/DL (ref 7–20)
CALCIUM SERPL-MCNC: 8.2 MG/DL (ref 8.3–10.6)
CHLORIDE BLD-SCNC: 86 MMOL/L (ref 99–110)
CO2: 22 MMOL/L (ref 21–32)
CREAT SERPL-MCNC: 0.6 MG/DL (ref 0.9–1.3)
EKG ATRIAL RATE: 116 BPM
EKG DIAGNOSIS: NORMAL
EKG P AXIS: 49 DEGREES
EKG P-R INTERVAL: 142 MS
EKG Q-T INTERVAL: 340 MS
EKG QRS DURATION: 88 MS
EKG QTC CALCULATION (BAZETT): 472 MS
EKG R AXIS: 45 DEGREES
EKG T AXIS: 45 DEGREES
EKG VENTRICULAR RATE: 116 BPM
EOSINOPHILS ABSOLUTE: 0 K/UL (ref 0–0.6)
EOSINOPHILS RELATIVE PERCENT: 0 %
GFR AFRICAN AMERICAN: >60
GFR NON-AFRICAN AMERICAN: >60
GLOBULIN: 2.3 G/DL
GLUCOSE BLD-MCNC: 115 MG/DL (ref 70–99)
HCT VFR BLD CALC: 37.2 % (ref 40.5–52.5)
HEMOGLOBIN: 12.7 G/DL (ref 13.5–17.5)
LYMPHOCYTES ABSOLUTE: 1.3 K/UL (ref 1–5.1)
LYMPHOCYTES RELATIVE PERCENT: 8.8 %
MAGNESIUM: 2.3 MG/DL (ref 1.8–2.4)
MCH RBC QN AUTO: 31.1 PG (ref 26–34)
MCHC RBC AUTO-ENTMCNC: 34.1 G/DL (ref 31–36)
MCV RBC AUTO: 91.4 FL (ref 80–100)
MONOCYTES ABSOLUTE: 0.8 K/UL (ref 0–1.3)
MONOCYTES RELATIVE PERCENT: 5.3 %
NEUTROPHILS ABSOLUTE: 12.6 K/UL (ref 1.7–7.7)
NEUTROPHILS RELATIVE PERCENT: 85.7 %
PDW BLD-RTO: 14.3 % (ref 12.4–15.4)
PLATELET # BLD: 225 K/UL (ref 135–450)
PMV BLD AUTO: 6.8 FL (ref 5–10.5)
POTASSIUM REFLEX MAGNESIUM: 3.2 MMOL/L (ref 3.5–5.1)
RBC # BLD: 4.07 M/UL (ref 4.2–5.9)
S PYO AG THROAT QL: NEGATIVE
SARS-COV-2, NAAT: NOT DETECTED
SODIUM BLD-SCNC: 130 MMOL/L (ref 136–145)
TOTAL PROTEIN: 6.5 G/DL (ref 6.4–8.2)
TRIGL SERPL-MCNC: 193 MG/DL (ref 0–150)
WBC # BLD: 14.7 K/UL (ref 4–11)

## 2021-03-28 PROCEDURE — 85025 COMPLETE CBC W/AUTO DIFF WBC: CPT

## 2021-03-28 PROCEDURE — 6360000002 HC RX W HCPCS: Performed by: INTERNAL MEDICINE

## 2021-03-28 PROCEDURE — 87880 STREP A ASSAY W/OPTIC: CPT

## 2021-03-28 PROCEDURE — C9113 INJ PANTOPRAZOLE SODIUM, VIA: HCPCS | Performed by: INTERNAL MEDICINE

## 2021-03-28 PROCEDURE — 87081 CULTURE SCREEN ONLY: CPT

## 2021-03-28 PROCEDURE — 6370000000 HC RX 637 (ALT 250 FOR IP): Performed by: INTERNAL MEDICINE

## 2021-03-28 PROCEDURE — 87635 SARS-COV-2 COVID-19 AMP PRB: CPT

## 2021-03-28 PROCEDURE — 93010 ELECTROCARDIOGRAM REPORT: CPT | Performed by: INTERNAL MEDICINE

## 2021-03-28 PROCEDURE — 80053 COMPREHEN METABOLIC PANEL: CPT

## 2021-03-28 PROCEDURE — 83735 ASSAY OF MAGNESIUM: CPT

## 2021-03-28 PROCEDURE — 2580000003 HC RX 258: Performed by: NURSE PRACTITIONER

## 2021-03-28 PROCEDURE — 1200000000 HC SEMI PRIVATE

## 2021-03-28 PROCEDURE — 6370000000 HC RX 637 (ALT 250 FOR IP): Performed by: NURSE PRACTITIONER

## 2021-03-28 PROCEDURE — 2580000003 HC RX 258: Performed by: INTERNAL MEDICINE

## 2021-03-28 PROCEDURE — 84478 ASSAY OF TRIGLYCERIDES: CPT

## 2021-03-28 RX ORDER — LORAZEPAM 1 MG/1
2 TABLET ORAL
Status: DISCONTINUED | OUTPATIENT
Start: 2021-03-28 | End: 2021-03-29

## 2021-03-28 RX ORDER — LISINOPRIL 10 MG/1
15 TABLET ORAL DAILY
Status: ON HOLD | COMMUNITY
End: 2021-03-28

## 2021-03-28 RX ORDER — LORAZEPAM 2 MG/ML
2 INJECTION INTRAMUSCULAR
Status: DISCONTINUED | OUTPATIENT
Start: 2021-03-28 | End: 2021-03-29

## 2021-03-28 RX ORDER — POTASSIUM CHLORIDE 7.45 MG/ML
10 INJECTION INTRAVENOUS PRN
Status: DISCONTINUED | OUTPATIENT
Start: 2021-03-28 | End: 2021-03-30 | Stop reason: HOSPADM

## 2021-03-28 RX ORDER — LORAZEPAM 1 MG/1
4 TABLET ORAL
Status: DISCONTINUED | OUTPATIENT
Start: 2021-03-28 | End: 2021-03-28 | Stop reason: SDUPTHER

## 2021-03-28 RX ORDER — PANTOPRAZOLE SODIUM 20 MG/1
20 TABLET, DELAYED RELEASE ORAL DAILY
Status: ON HOLD | COMMUNITY
End: 2021-03-30 | Stop reason: HOSPADM

## 2021-03-28 RX ORDER — MAGNESIUM SULFATE 1 G/100ML
1000 INJECTION INTRAVENOUS PRN
Status: DISCONTINUED | OUTPATIENT
Start: 2021-03-28 | End: 2021-03-30 | Stop reason: HOSPADM

## 2021-03-28 RX ORDER — PROPRANOLOL HYDROCHLORIDE 20 MG/1
20 TABLET ORAL 3 TIMES DAILY
Status: ON HOLD | COMMUNITY
End: 2021-03-28

## 2021-03-28 RX ORDER — LORAZEPAM 1 MG/1
2 TABLET ORAL
Status: DISCONTINUED | OUTPATIENT
Start: 2021-03-28 | End: 2021-03-28 | Stop reason: SDUPTHER

## 2021-03-28 RX ORDER — POTASSIUM CHLORIDE 20 MEQ/1
40 TABLET, EXTENDED RELEASE ORAL PRN
Status: DISCONTINUED | OUTPATIENT
Start: 2021-03-28 | End: 2021-03-30 | Stop reason: HOSPADM

## 2021-03-28 RX ORDER — NIFEDIPINE 30 MG/1
30 TABLET, FILM COATED, EXTENDED RELEASE ORAL DAILY
Status: ON HOLD | COMMUNITY
End: 2021-03-30 | Stop reason: HOSPADM

## 2021-03-28 RX ORDER — SODIUM CHLORIDE 0.9 % (FLUSH) 0.9 %
10 SYRINGE (ML) INJECTION EVERY 12 HOURS SCHEDULED
Status: DISCONTINUED | OUTPATIENT
Start: 2021-03-28 | End: 2021-03-30 | Stop reason: HOSPADM

## 2021-03-28 RX ORDER — LORAZEPAM 1 MG/1
4 TABLET ORAL
Status: DISCONTINUED | OUTPATIENT
Start: 2021-03-28 | End: 2021-03-29

## 2021-03-28 RX ORDER — LORAZEPAM 2 MG/ML
1 INJECTION INTRAMUSCULAR
Status: DISCONTINUED | OUTPATIENT
Start: 2021-03-28 | End: 2021-03-30

## 2021-03-28 RX ORDER — LISINOPRIL 10 MG/1
15 TABLET ORAL DAILY
Status: ON HOLD | COMMUNITY
End: 2021-03-30 | Stop reason: HOSPADM

## 2021-03-28 RX ORDER — PROPRANOLOL HYDROCHLORIDE 20 MG/1
20 TABLET ORAL 3 TIMES DAILY
COMMUNITY

## 2021-03-28 RX ORDER — M-VIT,TX,IRON,MINS/CALC/FOLIC 27MG-0.4MG
1 TABLET ORAL DAILY
Status: DISCONTINUED | OUTPATIENT
Start: 2021-03-28 | End: 2021-03-30 | Stop reason: HOSPADM

## 2021-03-28 RX ORDER — LORAZEPAM 2 MG/ML
4 INJECTION INTRAMUSCULAR
Status: DISCONTINUED | OUTPATIENT
Start: 2021-03-28 | End: 2021-03-28 | Stop reason: SDUPTHER

## 2021-03-28 RX ORDER — LORAZEPAM 1 MG/1
3 TABLET ORAL
Status: DISCONTINUED | OUTPATIENT
Start: 2021-03-28 | End: 2021-03-29

## 2021-03-28 RX ORDER — LORAZEPAM 1 MG/1
3 TABLET ORAL
Status: DISCONTINUED | OUTPATIENT
Start: 2021-03-28 | End: 2021-03-28 | Stop reason: SDUPTHER

## 2021-03-28 RX ORDER — SODIUM CHLORIDE 0.9 % (FLUSH) 0.9 %
10 SYRINGE (ML) INJECTION PRN
Status: DISCONTINUED | OUTPATIENT
Start: 2021-03-28 | End: 2021-03-30 | Stop reason: HOSPADM

## 2021-03-28 RX ORDER — SODIUM CHLORIDE 9 MG/ML
25 INJECTION, SOLUTION INTRAVENOUS PRN
Status: DISCONTINUED | OUTPATIENT
Start: 2021-03-28 | End: 2021-03-30 | Stop reason: HOSPADM

## 2021-03-28 RX ORDER — LORAZEPAM 2 MG/ML
3 INJECTION INTRAMUSCULAR
Status: DISCONTINUED | OUTPATIENT
Start: 2021-03-28 | End: 2021-03-29

## 2021-03-28 RX ORDER — LORAZEPAM 2 MG/ML
1 INJECTION INTRAMUSCULAR
Status: DISCONTINUED | OUTPATIENT
Start: 2021-03-28 | End: 2021-03-28 | Stop reason: SDUPTHER

## 2021-03-28 RX ORDER — LORAZEPAM 1 MG/1
1 TABLET ORAL
Status: DISCONTINUED | OUTPATIENT
Start: 2021-03-28 | End: 2021-03-28 | Stop reason: SDUPTHER

## 2021-03-28 RX ORDER — QUETIAPINE FUMARATE 100 MG/1
50 TABLET, FILM COATED ORAL 3 TIMES DAILY
COMMUNITY

## 2021-03-28 RX ORDER — CHLORDIAZEPOXIDE HYDROCHLORIDE 25 MG/1
25 CAPSULE, GELATIN COATED ORAL 3 TIMES DAILY
Status: DISCONTINUED | OUTPATIENT
Start: 2021-03-28 | End: 2021-03-30 | Stop reason: HOSPADM

## 2021-03-28 RX ORDER — LORAZEPAM 1 MG/1
1 TABLET ORAL
Status: DISCONTINUED | OUTPATIENT
Start: 2021-03-28 | End: 2021-03-30

## 2021-03-28 RX ORDER — LORAZEPAM 2 MG/ML
4 INJECTION INTRAMUSCULAR
Status: DISCONTINUED | OUTPATIENT
Start: 2021-03-28 | End: 2021-03-29

## 2021-03-28 RX ORDER — ESCITALOPRAM OXALATE 20 MG/1
20 TABLET ORAL DAILY
COMMUNITY

## 2021-03-28 RX ORDER — LORAZEPAM 2 MG/ML
2 INJECTION INTRAMUSCULAR
Status: DISCONTINUED | OUTPATIENT
Start: 2021-03-28 | End: 2021-03-28 | Stop reason: SDUPTHER

## 2021-03-28 RX ORDER — LORAZEPAM 2 MG/ML
3 INJECTION INTRAMUSCULAR
Status: DISCONTINUED | OUTPATIENT
Start: 2021-03-28 | End: 2021-03-28 | Stop reason: SDUPTHER

## 2021-03-28 RX ADMIN — LORAZEPAM 3 MG: 2 INJECTION, SOLUTION INTRAMUSCULAR; INTRAVENOUS at 10:42

## 2021-03-28 RX ADMIN — MORPHINE SULFATE 2 MG: 2 INJECTION, SOLUTION INTRAMUSCULAR; INTRAVENOUS at 20:06

## 2021-03-28 RX ADMIN — NYSTATIN 500000 UNITS: 500000 SUSPENSION ORAL at 12:03

## 2021-03-28 RX ADMIN — MORPHINE SULFATE 4 MG: 4 INJECTION, SOLUTION INTRAMUSCULAR; INTRAVENOUS at 14:34

## 2021-03-28 RX ADMIN — MORPHINE SULFATE 4 MG: 4 INJECTION, SOLUTION INTRAMUSCULAR; INTRAVENOUS at 12:03

## 2021-03-28 RX ADMIN — Medication 1 TABLET: at 08:45

## 2021-03-28 RX ADMIN — MORPHINE SULFATE 4 MG: 4 INJECTION, SOLUTION INTRAMUSCULAR; INTRAVENOUS at 08:53

## 2021-03-28 RX ADMIN — SODIUM CHLORIDE, POTASSIUM CHLORIDE, SODIUM LACTATE AND CALCIUM CHLORIDE: 600; 310; 30; 20 INJECTION, SOLUTION INTRAVENOUS at 09:46

## 2021-03-28 RX ADMIN — MORPHINE SULFATE 2 MG: 2 INJECTION, SOLUTION INTRAMUSCULAR; INTRAVENOUS at 22:26

## 2021-03-28 RX ADMIN — ONDANSETRON 4 MG: 2 INJECTION INTRAMUSCULAR; INTRAVENOUS at 10:42

## 2021-03-28 RX ADMIN — MORPHINE SULFATE 4 MG: 4 INJECTION, SOLUTION INTRAMUSCULAR; INTRAVENOUS at 01:23

## 2021-03-28 RX ADMIN — ENOXAPARIN SODIUM 40 MG: 40 INJECTION SUBCUTANEOUS at 08:47

## 2021-03-28 RX ADMIN — NYSTATIN 500000 UNITS: 500000 SUSPENSION ORAL at 17:35

## 2021-03-28 RX ADMIN — MORPHINE SULFATE 4 MG: 4 INJECTION, SOLUTION INTRAMUSCULAR; INTRAVENOUS at 17:36

## 2021-03-28 RX ADMIN — Medication 10 ML: at 08:47

## 2021-03-28 RX ADMIN — CHLORDIAZEPOXIDE HYDROCHLORIDE 25 MG: 25 CAPSULE ORAL at 19:59

## 2021-03-28 RX ADMIN — Medication 1 LOZENGE: at 20:16

## 2021-03-28 RX ADMIN — ONDANSETRON 4 MG: 2 INJECTION INTRAMUSCULAR; INTRAVENOUS at 01:23

## 2021-03-28 RX ADMIN — SODIUM CHLORIDE, POTASSIUM CHLORIDE, SODIUM LACTATE AND CALCIUM CHLORIDE: 600; 310; 30; 20 INJECTION, SOLUTION INTRAVENOUS at 09:47

## 2021-03-28 RX ADMIN — PROMETHAZINE HYDROCHLORIDE 12.5 MG: 25 TABLET ORAL at 22:26

## 2021-03-28 RX ADMIN — CHLORDIAZEPOXIDE HYDROCHLORIDE 25 MG: 25 CAPSULE ORAL at 08:45

## 2021-03-28 RX ADMIN — PANTOPRAZOLE SODIUM 40 MG: 40 INJECTION, POWDER, FOR SOLUTION INTRAVENOUS at 08:45

## 2021-03-28 RX ADMIN — THIAMINE HYDROCHLORIDE 100 MG: 100 INJECTION, SOLUTION INTRAMUSCULAR; INTRAVENOUS at 08:45

## 2021-03-28 RX ADMIN — LORAZEPAM 2 MG: 2 INJECTION, SOLUTION INTRAMUSCULAR; INTRAVENOUS at 01:48

## 2021-03-28 RX ADMIN — MORPHINE SULFATE 2 MG: 2 INJECTION, SOLUTION INTRAMUSCULAR; INTRAVENOUS at 06:54

## 2021-03-28 RX ADMIN — POTASSIUM CHLORIDE 40 MEQ: 1500 TABLET, EXTENDED RELEASE ORAL at 12:23

## 2021-03-28 RX ADMIN — CHLORDIAZEPOXIDE HYDROCHLORIDE 25 MG: 25 CAPSULE ORAL at 14:35

## 2021-03-28 RX ADMIN — NYSTATIN 500000 UNITS: 500000 SUSPENSION ORAL at 19:59

## 2021-03-28 ASSESSMENT — PAIN SCALES - GENERAL
PAINLEVEL_OUTOF10: 10
PAINLEVEL_OUTOF10: 6
PAINLEVEL_OUTOF10: 7

## 2021-03-28 ASSESSMENT — PAIN DESCRIPTION - PAIN TYPE: TYPE: ACUTE PAIN

## 2021-03-28 NOTE — H&P
Hospital Medicine History & Physical      PCP: No primary care provider on file. Date of Admission: 3/27/2021    Date of Service: Pt seen/examined on 3/27/2021 and Admitted to Inpatient with expected LOS greater than two midnights due to medical therapy. Chief Complaint: Alcohol intoxication      History Of Present Illness:   27 y.o. male who presented to [facility] with above complaints  Patient presented to the ED via EMS after patient was found intoxicated by his girlfriend. Patient reports this week he has been drinking a lot of alcohol. He reports to me that he has been drinking \"a vanilla extract \"it contains 35% alcohol in content. He does not drink any other liquor or beer. When asked if he is added to the taste, patient replies \"I could not tell you why\". And when asked why he has been drinking heavily this week, patient repeats \" I could not tell you why\". He does remember falling down the stairs yesterday subsequently developed pain on his nose, on arrival to the ER patient was extremely intoxicated with very limited history available. Patient reports mild abdominal pain and hiccups, no nausea vomiting. Past Medical History:          Diagnosis Date    GERD (gastroesophageal reflux disease)        Past Surgical History:          Procedure Laterality Date    SHOULDER SURGERY Right        Medications Prior to Admission:      Prior to Admission medications    Medication Sig Start Date End Date Taking? Authorizing Provider   omeprazole (PRILOSEC) 20 MG delayed release capsule Take 20 mg by mouth Daily    Historical Provider, MD       Allergies:  Tramadol    Social History:      The patient currently lives at home    TOBACCO:   reports that he has never smoked. He has never used smokeless tobacco.  ETOH:   reports current alcohol use.   E-Cigarettes/Vaping Use     Questions Responses    E-Cigarette/Vaping Use     Start Date     Passive Exposure     Quit Date     Counseling Given Comments             Family History:  Reviewed in detail and negative for DM, CAD, Cancer, CVA. REVIEW OF SYSTEMS:   Pertinent positives as noted in the HPI. All other systems reviewed and negative. PHYSICAL EXAM PERFORMED:    BP (!) 141/113   Pulse 118   Temp 97.3 °F (36.3 °C) (Oral)   Resp 16   SpO2 92%     General appearance: Inebriated, no apparent distress, appears stated age and cooperative. HEENT:  Normal cephalic, without obvious deformity. Pupils equal, round, and reactive to light. Extra ocular muscles intact. Conjunctivae/corneas clear. Nasal bridge swollen and tender with erythema  Neck: Supple, with full range of motion. No jugular venous distention. Trachea midline. Respiratory:  Normal respiratory effort. Clear to auscultation, bilaterally without Rales/Wheezes/Rhonchi. Cardiovascular:  Regular rate and rhythm with normal S1/S2 without murmurs, rubs or gallops. Abdomen: Soft, non-tender, non-distended with normal bowel sounds. Musculoskeletal:  No clubbing, cyanosis or edema bilaterally. Full range of motion without deformity. Right chest tenderness along midaxillary line at the seventh rib. Skin: Skin color, texture, turgor normal.  No rashes or lesions. Neurologic: Patient somnolent, but arousable easily, neurovascularly intact without any focal sensory/motor deficits. Cranial nerves: II-XII intact, grossly non-focal.  Psychiatric:  Alert and oriented, thought content appropriate, normal insight  Capillary Refill: Brisk,< 3 seconds   Peripheral Pulses: +2 palpable, equal bilaterally       Labs:     Recent Labs     03/27/21  1710   WBC 17.9*   HGB 15.5   HCT 45.1        Recent Labs     03/27/21  1710   *   K 3.0*   CL 78*   CO2 22   BUN 20   CREATININE 0.9   CALCIUM 9.3     Recent Labs     03/27/21  1710   *   *   BILITOT 0.6   ALKPHOS 146*     No results for input(s): INR in the last 72 hours.   No results for input(s): Jamie Charles in the last 72 hours. Urinalysis:      Lab Results   Component Value Date    NITRU Negative 03/27/2021    WBCUA None seen 03/27/2021    BACTERIA Rare 03/27/2021    RBCUA 5-10 03/27/2021    BLOODU LARGE 03/27/2021    SPECGRAV >=1.030 03/27/2021    GLUCOSEU Negative 03/27/2021       Radiology:     I personally reviewed the CT abdomen pelvis, CT head, chest x-ray and CT facial bones and also reviewed all the radiologist interpretation      CT ABDOMEN PELVIS W IV CONTRAST Additional Contrast? None   Final Result   Peripancreatic fat stranding, compatible with acute pancreatitis. No evidence   of necrosis is seen at this time. Interval development of a lobulated cystic structure measuring 3.1 cm   associated with the pancreatic tail, which likely reflects formation of a   pseudocyst, but serial surveillance is recommended. Marked mural thickening of the distal esophagus, with surrounding fat   stranding, which is new when compared to the previous exam, concerning for   esophagitis. Again, ulceration is not detected, but that can be difficult to   visualize with CT technique. Consider direct visualization. Severe diffuse hepatic steatosis. CT Head WO Contrast   Final Result   Head CT: No acute intracranial abnormality detected. Facial CT: Bilateral nasal bone fractures with deviation to the left, with   overlying soft tissue swelling. CT FACIAL BONES WO CONTRAST   Final Result   Head CT: No acute intracranial abnormality detected. Facial CT: Bilateral nasal bone fractures with deviation to the left, with   overlying soft tissue swelling. XR CHEST PORTABLE   Final Result   Right lateral 7th rib fracture and adjacent airspace opacity in right lung   probably contusion.              ASSESSMENT:PLAN:    Acute alcoholic pancreatitis without infection or necrosis  EtOH lvl 454,  lipase 463, CT abdomen showing pancreatitis with pseudocyst, no necrosis or abscess  Plan  -Aggressive IV fluid hydration  -N.p.o. except ice chips, slowly advance diet when pain improves and clinically appropriate  -Continue supportive care with aggressive IV fluids, IV antiemetics and analgesics  -GI consult -not indicated at this time  -EtOH abstinence counseling  -US GB  -IV PPI    Acute alcohol intoxication   -EtOH abstinence counseling given  -IV thiamine daily  -MVI daily  -pt exhibiting withdrawal symptoms overnight per RN, initiated on Librium and CIWA protocol    Closed fracture of one rib of right side  -Pain control    Pulmonary contusion  -Incentive spirometry and deep breathing exercises  -Monitor    Bilateral nasal bone fracture  -Follow-up with ENT outpatient    Elevated liver enzymes  Elevated transaminases due to alcoholic liver disease. Monitor    Esophagitis  -Likely alcohol induced, start IV PPI and monitor for improvement of symptoms    Leukocytosis  -Likely reactive due to pancreatitis. No evidence of obvious infection at this time    DVT Prophylaxis: Lovenox  Diet: Diet NPO Effective Now  Code Status: Full code  PT/OT Eval Status: Not consulted    Dispo -IP stay       Alona Allen MD    Thank you No primary care provider on file. for the opportunity to be involved in this patient's care. If you have any questions or concerns please feel free to contact me at [phone].

## 2021-03-28 NOTE — PROGRESS NOTES
The following sent via perfect serve to crosscover NP: \"Pt is starting to show withdraw symptoms, pretty severely. He is have hallucinations both visual and audible, vomiting, disoriented, anxious/agitated ect. Feels like he is going to have a seizure and die. CIWA score of 17. Can you please place CIWA orders. Thanks. \" Waiting on response.

## 2021-03-28 NOTE — PROGRESS NOTES
Message sent to  \"Talked to pts dad. He said he would like pt to have a psych consult. He said pt has been urinating and defecating all over his house. He said pt went to alcohol rehab a couple times over the past year. I called  for resources and she suggested to ask you for psych consult.  Thanks\"

## 2021-03-28 NOTE — PROGRESS NOTES
Hospitalist Progress Note      PCP: No primary care provider on file. Date of Admission: 3/27/2021    Chief Complaint:  Alcoholism, abdominal pain       Subjective:  Ongoing abdominal pain. Tremulous hands. Medications:  Reviewed    Infusion Medications    sodium chloride      sodium chloride      lactated ringers 150 mL/hr at 03/28/21 0947    sodium chloride       Scheduled Medications    sodium chloride flush  10 mL Intravenous 2 times per day    multivitamin  1 tablet Oral Daily    chlordiazePOXIDE  25 mg Oral TID    sodium chloride flush  10 mL Intravenous 2 times per day    nystatin  5 mL Oral 4x Daily    pantoprazole  40 mg Intravenous Daily    thiamine  100 mg Intravenous Daily    sodium chloride flush  10 mL Intravenous 2 times per day    enoxaparin  40 mg Subcutaneous Daily     PRN Meds: sodium chloride flush, sodium chloride, LORazepam **OR** LORazepam **OR** LORazepam **OR** LORazepam **OR** LORazepam **OR** LORazepam **OR** LORazepam **OR** LORazepam, sodium chloride flush, sodium chloride, magnesium sulfate, potassium chloride **OR** potassium alternative oral replacement **OR** potassium chloride, sodium chloride flush, sodium chloride, acetaminophen, promethazine **OR** ondansetron, morphine **OR** morphine      Intake/Output Summary (Last 24 hours) at 3/28/2021 1113  Last data filed at 3/28/2021 0214  Gross per 24 hour   Intake 1000 ml   Output --   Net 1000 ml       Physical Exam Performed:    BP (!) 158/90   Pulse 112   Temp 99.1 °F (37.3 °C) (Axillary)   Resp 18   SpO2 94%       General appearance: No apparent distress, appears stated age and cooperative. HEENT: Pupils equal, round, and reactive to light. Conjunctivae/corneas clear. Thrush. Neck: Supple, with full range of motion. No jugular venous distention. Trachea midline. Respiratory:  Normal respiratory effort. Clear to auscultation, bilaterally without Rales/Wheezes/Rhonchi.   Cardiovascular: Regular rate and rhythm with normal S1/S2 without murmurs, rubs or gallops. Abdomen: Soft, epigastric tenderness, non-distended with normal bowel sounds. Musculoskeletal: No clubbing, cyanosis or edema bilaterally. Full range of motion without deformity. Skin: Skin color, texture, turgor normal.  No rashes or lesions. Neurologic:  Neurovascularly intact without any focal sensory/motor deficits. Cranial nerves: II-XII intact, grossly non-focal.  Psychiatric: Alert and oriented, thought content appropriate, normal insight. Some lorazepam-seeking behavior. Capillary Refill: Brisk,< 3 seconds   Peripheral Pulses: +2 palpable, equal bilaterally       Labs:   Recent Labs     03/27/21  1710 03/28/21  0440   WBC 17.9* 14.7*   HGB 15.5 12.7*   HCT 45.1 37.2*    225     Recent Labs     03/27/21  1710 03/28/21  0440   * 130*   K 3.0* 3.2*   CL 78* 86*   CO2 22 22   BUN 20 12   CREATININE 0.9 0.6*   CALCIUM 9.3 8.2*     Recent Labs     03/27/21  1710 03/28/21  0440   * 169*   * 127*   BILITOT 0.6 0.5   ALKPHOS 146* 102     No results for input(s): INR in the last 72 hours. No results for input(s): Burnice [place] in the last 72 hours. Urinalysis:      Lab Results   Component Value Date    NITRU Negative 03/27/2021    WBCUA None seen 03/27/2021    BACTERIA Rare 03/27/2021    RBCUA 5-10 03/27/2021    BLOODU LARGE 03/27/2021    SPECGRAV >=1.030 03/27/2021    GLUCOSEU Negative 03/27/2021       Radiology:  CT ABDOMEN PELVIS W IV CONTRAST Additional Contrast? None   Final Result   Peripancreatic fat stranding, compatible with acute pancreatitis. No evidence   of necrosis is seen at this time. Interval development of a lobulated cystic structure measuring 3.1 cm   associated with the pancreatic tail, which likely reflects formation of a   pseudocyst, but serial surveillance is recommended.       Marked mural thickening of the distal esophagus, with surrounding fat   stranding, which is new when EGD.    R rib fracture, pulmonary contusion, nasal fractures  - supportive care,  ENT f/u as outpatient. Thrush. Nystatin. F/u strep swab. DVT Prophylaxis: enoxaparin  Diet: Diet NPO Effective Now Exceptions are: Ice Chips  Code Status: Full Code    PT/OT Eval Status: not indicated    Dispo - when pain and withdrawal reasonably controlled, and when he is tolerating PO. Perhaps 3/30. He lives at home.       Michelle Reis MD

## 2021-03-28 NOTE — PROGRESS NOTES
Pt alert and oriented but forgetful, VSS. Pt c/o sore throat and pt has dry cough, rapid covid neg. Strep screen pending. Shift assessment completed and documented. Seizure precautions in place. CIWA score 16 at 1039, ativan given per order. CIWA score 3 at 1202. Pt denies any needs at this time. Bed locked and in lowest position. Call light within reach.

## 2021-03-28 NOTE — PROGRESS NOTES
Talked to pts mother. She states pt drinks vanilla extract for alcohol content. She said she is unsure if this is due to a psych issue or because it can be easily stolen. Pt states he drinks it because it is easy to hide from his girlfriend. Told her case management will provide pt with resources. She said she would like pt to do inpatient rehab because of multiple relapses over the past year. Pt states he would do inpatient rehab if it is approved by his work. She states pt fell in mid February at fractured his rib and R wrist/hand. She states pt was supposed to follow up with ortho at [facility]. Pt states he was given a brace for his wrist but he stopped using it 4 days ago. He states his follow up appointment with ortho is next week. She states pts girlfriend sends her pictures of what looks like coffee grounds all over his house and she thinks it may be vomit. Earlier pts father had said pt was urinating and defecating all his over house but pt told MD this was from his dog.

## 2021-03-29 ENCOUNTER — APPOINTMENT (OUTPATIENT)
Dept: ULTRASOUND IMAGING | Age: 31
DRG: 439 | End: 2021-03-29
Payer: COMMERCIAL

## 2021-03-29 LAB
ALBUMIN SERPL-MCNC: 3.7 G/DL (ref 3.4–5)
ALP BLD-CCNC: 95 U/L (ref 40–129)
ALT SERPL-CCNC: 92 U/L (ref 10–40)
ANION GAP SERPL CALCULATED.3IONS-SCNC: 19 MMOL/L (ref 3–16)
AST SERPL-CCNC: 102 U/L (ref 15–37)
BILIRUB SERPL-MCNC: 0.5 MG/DL (ref 0–1)
BILIRUBIN DIRECT: <0.2 MG/DL (ref 0–0.3)
BILIRUBIN, INDIRECT: ABNORMAL MG/DL (ref 0–1)
BUN BLDV-MCNC: 9 MG/DL (ref 7–20)
CALCIUM SERPL-MCNC: 8.8 MG/DL (ref 8.3–10.6)
CHLORIDE BLD-SCNC: 92 MMOL/L (ref 99–110)
CO2: 21 MMOL/L (ref 21–32)
CREAT SERPL-MCNC: 0.6 MG/DL (ref 0.9–1.3)
GFR AFRICAN AMERICAN: >60
GFR NON-AFRICAN AMERICAN: >60
GLUCOSE BLD-MCNC: 98 MG/DL (ref 70–99)
HCT VFR BLD CALC: 38.3 % (ref 40.5–52.5)
HEMOGLOBIN: 13 G/DL (ref 13.5–17.5)
MAGNESIUM: 2.3 MG/DL (ref 1.8–2.4)
MCH RBC QN AUTO: 31.7 PG (ref 26–34)
MCHC RBC AUTO-ENTMCNC: 34 G/DL (ref 31–36)
MCV RBC AUTO: 93.3 FL (ref 80–100)
PDW BLD-RTO: 14.2 % (ref 12.4–15.4)
PLATELET # BLD: 150 K/UL (ref 135–450)
PMV BLD AUTO: 6.8 FL (ref 5–10.5)
POTASSIUM REFLEX MAGNESIUM: 3.2 MMOL/L (ref 3.5–5.1)
RBC # BLD: 4.11 M/UL (ref 4.2–5.9)
SODIUM BLD-SCNC: 132 MMOL/L (ref 136–145)
TOTAL PROTEIN: 6 G/DL (ref 6.4–8.2)
WBC # BLD: 7.9 K/UL (ref 4–11)

## 2021-03-29 PROCEDURE — 2500000003 HC RX 250 WO HCPCS: Performed by: INTERNAL MEDICINE

## 2021-03-29 PROCEDURE — 36415 COLL VENOUS BLD VENIPUNCTURE: CPT

## 2021-03-29 PROCEDURE — 6360000002 HC RX W HCPCS: Performed by: INTERNAL MEDICINE

## 2021-03-29 PROCEDURE — 6370000000 HC RX 637 (ALT 250 FOR IP): Performed by: INTERNAL MEDICINE

## 2021-03-29 PROCEDURE — 85027 COMPLETE CBC AUTOMATED: CPT

## 2021-03-29 PROCEDURE — 2580000003 HC RX 258: Performed by: NURSE PRACTITIONER

## 2021-03-29 PROCEDURE — 80048 BASIC METABOLIC PNL TOTAL CA: CPT

## 2021-03-29 PROCEDURE — 80076 HEPATIC FUNCTION PANEL: CPT

## 2021-03-29 PROCEDURE — C9113 INJ PANTOPRAZOLE SODIUM, VIA: HCPCS | Performed by: INTERNAL MEDICINE

## 2021-03-29 PROCEDURE — 83735 ASSAY OF MAGNESIUM: CPT

## 2021-03-29 PROCEDURE — 76705 ECHO EXAM OF ABDOMEN: CPT

## 2021-03-29 PROCEDURE — 1200000000 HC SEMI PRIVATE

## 2021-03-29 PROCEDURE — 2580000003 HC RX 258: Performed by: INTERNAL MEDICINE

## 2021-03-29 RX ORDER — OXYCODONE HYDROCHLORIDE 5 MG/1
5 TABLET ORAL EVERY 4 HOURS PRN
Status: DISCONTINUED | OUTPATIENT
Start: 2021-03-29 | End: 2021-03-30 | Stop reason: HOSPADM

## 2021-03-29 RX ORDER — HYDRALAZINE HYDROCHLORIDE 20 MG/ML
5 INJECTION INTRAMUSCULAR; INTRAVENOUS EVERY 4 HOURS PRN
Status: DISCONTINUED | OUTPATIENT
Start: 2021-03-29 | End: 2021-03-30 | Stop reason: HOSPADM

## 2021-03-29 RX ORDER — LABETALOL HYDROCHLORIDE 5 MG/ML
5 INJECTION, SOLUTION INTRAVENOUS EVERY 4 HOURS PRN
Status: DISCONTINUED | OUTPATIENT
Start: 2021-03-29 | End: 2021-03-30 | Stop reason: HOSPADM

## 2021-03-29 RX ORDER — OXYCODONE HYDROCHLORIDE 5 MG/1
10 TABLET ORAL EVERY 4 HOURS PRN
Status: DISCONTINUED | OUTPATIENT
Start: 2021-03-29 | End: 2021-03-30 | Stop reason: HOSPADM

## 2021-03-29 RX ADMIN — OXYCODONE 10 MG: 5 TABLET ORAL at 21:48

## 2021-03-29 RX ADMIN — CHLORDIAZEPOXIDE HYDROCHLORIDE 25 MG: 25 CAPSULE ORAL at 21:48

## 2021-03-29 RX ADMIN — Medication 10 ML: at 09:22

## 2021-03-29 RX ADMIN — SODIUM CHLORIDE, POTASSIUM CHLORIDE, SODIUM LACTATE AND CALCIUM CHLORIDE: 600; 310; 30; 20 INJECTION, SOLUTION INTRAVENOUS at 23:01

## 2021-03-29 RX ADMIN — SODIUM CHLORIDE, POTASSIUM CHLORIDE, SODIUM LACTATE AND CALCIUM CHLORIDE: 600; 310; 30; 20 INJECTION, SOLUTION INTRAVENOUS at 09:20

## 2021-03-29 RX ADMIN — HYDRALAZINE HYDROCHLORIDE 5 MG: 20 INJECTION INTRAMUSCULAR; INTRAVENOUS at 14:32

## 2021-03-29 RX ADMIN — PANTOPRAZOLE SODIUM 40 MG: 40 INJECTION, POWDER, FOR SOLUTION INTRAVENOUS at 09:23

## 2021-03-29 RX ADMIN — NYSTATIN 500000 UNITS: 500000 SUSPENSION ORAL at 09:31

## 2021-03-29 RX ADMIN — CHLORDIAZEPOXIDE HYDROCHLORIDE 25 MG: 25 CAPSULE ORAL at 13:41

## 2021-03-29 RX ADMIN — THIAMINE HYDROCHLORIDE 100 MG: 100 INJECTION, SOLUTION INTRAMUSCULAR; INTRAVENOUS at 09:25

## 2021-03-29 RX ADMIN — NYSTATIN 500000 UNITS: 500000 SUSPENSION ORAL at 17:01

## 2021-03-29 RX ADMIN — OXYCODONE 10 MG: 5 TABLET ORAL at 13:41

## 2021-03-29 RX ADMIN — SODIUM CHLORIDE, POTASSIUM CHLORIDE, SODIUM LACTATE AND CALCIUM CHLORIDE: 600; 310; 30; 20 INJECTION, SOLUTION INTRAVENOUS at 17:00

## 2021-03-29 RX ADMIN — LABETALOL HYDROCHLORIDE 5 MG: 5 INJECTION INTRAVENOUS at 17:08

## 2021-03-29 RX ADMIN — MORPHINE SULFATE 2 MG: 2 INJECTION, SOLUTION INTRAMUSCULAR; INTRAVENOUS at 06:08

## 2021-03-29 RX ADMIN — CHLORDIAZEPOXIDE HYDROCHLORIDE 25 MG: 25 CAPSULE ORAL at 09:30

## 2021-03-29 RX ADMIN — NYSTATIN 500000 UNITS: 500000 SUSPENSION ORAL at 21:48

## 2021-03-29 RX ADMIN — Medication 1 TABLET: at 09:30

## 2021-03-29 RX ADMIN — NYSTATIN 500000 UNITS: 500000 SUSPENSION ORAL at 13:38

## 2021-03-29 RX ADMIN — OXYCODONE 10 MG: 5 TABLET ORAL at 09:09

## 2021-03-29 RX ADMIN — Medication 10 ML: at 09:23

## 2021-03-29 RX ADMIN — OXYCODONE 10 MG: 5 TABLET ORAL at 17:46

## 2021-03-29 RX ADMIN — POTASSIUM BICARBONATE 40 MEQ: 782 TABLET, EFFERVESCENT ORAL at 09:21

## 2021-03-29 RX ADMIN — MORPHINE SULFATE 2 MG: 2 INJECTION, SOLUTION INTRAMUSCULAR; INTRAVENOUS at 03:22

## 2021-03-29 RX ADMIN — ENOXAPARIN SODIUM 40 MG: 40 INJECTION SUBCUTANEOUS at 09:29

## 2021-03-29 ASSESSMENT — PAIN SCALES - GENERAL
PAINLEVEL_OUTOF10: 8
PAINLEVEL_OUTOF10: 10
PAINLEVEL_OUTOF10: 6
PAINLEVEL_OUTOF10: 5

## 2021-03-29 NOTE — CARE COORDINATION
CASE MANAGEMENT INITIAL ASSESSMENT    Reviewed chart and completed assessment with: Patient     Explained Case Management role/services. Primary contact information: Soha Valdez- [facility] Care Decision Maker :   Primary Decision Maker: Tim Charlton - Parent - [phone]    Secondary Decision Maker: Rafael Heck - Parent - [phone]          Can this person be reached and be able to respond quickly, such as within a few minutes or hours? Yes  Per Patient request deactivated Leonila Passe date/status:03/27/2021 Inpatient   Diagnosis: Acute alcoholic pancreatitis  Is this a Readmission?:  No      Insurance:Self pay, referral made to [address] [place] [address] required for SNF: No       3 night stay required: No    Living arrangements, Adls, care needs, prior to admission: Home with girlfriend that stays with at times    Transportation: Active , reports will have ride at [address] at home:  Denies     Services in the home and/or outpatient, prior to admission: Reports has strong AA support group and sponsor, decline in patient or other outpatient resources     PT/OT recs: Not ordered     Hospital Exemption Notification (HEN):NA    Barriers to discharge:pending withdrawal sx, pain and PO diet     Plan/comments: Patient plans to dc home, decline inpatient rehab or outpatient supportive services reports is active with AA and has supportive sponsor. MARLEY Jennings       ECOCOREY on chart for MD signature

## 2021-03-29 NOTE — PROGRESS NOTES
Pt a/o. VSS. /110, Dr. Andra Saint aware. Seizure pads on bed. Shift assessment updated and documented.

## 2021-03-29 NOTE — PROGRESS NOTES
Hospitalist Progress Note      PCP: No primary care provider on file. Date of Admission: 3/27/2021    Chief Complaint:  Alcoholism, abdominal pain       Subjective:  Ongoing abdominal pain. Thirsty. Updated the Mother, who expressed her concern about his alcohol abuse and mood disorders. Medications:  Reviewed    Infusion Medications    sodium chloride      sodium chloride      lactated ringers 150 mL/hr at 03/29/21 0920    sodium chloride       Scheduled Medications    sodium chloride flush  10 mL Intravenous 2 times per day    multivitamin  1 tablet Oral Daily    chlordiazePOXIDE  25 mg Oral TID    sodium chloride flush  10 mL Intravenous 2 times per day    nystatin  5 mL Oral 4x Daily    pantoprazole  40 mg Intravenous Daily    thiamine  100 mg Intravenous Daily    sodium chloride flush  10 mL Intravenous 2 times per day    enoxaparin  40 mg Subcutaneous Daily     PRN Meds: oxyCODONE **OR** oxyCODONE, sodium chloride flush, sodium chloride, LORazepam **OR** LORazepam **OR** [DISCONTINUED] LORazepam **OR** [DISCONTINUED] LORazepam **OR** [DISCONTINUED] LORazepam **OR** [DISCONTINUED] LORazepam **OR** [DISCONTINUED] LORazepam **OR** [DISCONTINUED] LORazepam, sodium chloride flush, sodium chloride, magnesium sulfate, potassium chloride **OR** potassium alternative oral replacement **OR** potassium chloride, benzocaine-menthol, sodium chloride flush, sodium chloride, acetaminophen, [DISCONTINUED] promethazine **OR** ondansetron      Intake/Output Summary (Last 24 hours) at 3/29/2021 0931  Last data filed at 3/29/2021 0912  Gross per 24 hour   Intake 2081 ml   Output 1550 ml   Net 531 ml       Physical Exam Performed:    BP (!) 153/110   Pulse 99   Temp 97.7 °F (36.5 °C) (Oral)   Resp 16   SpO2 96%       General appearance: No apparent distress, appears stated age and cooperative. HEENT: Pupils equal, round, and reactive to light. Conjunctivae/corneas clear. Thrush.   Neck: Supple, with full range of motion. No jugular venous distention. Trachea midline. Respiratory:  Normal respiratory effort. Clear to auscultation, bilaterally without Rales/Wheezes/Rhonchi. Cardiovascular: Regular rate and rhythm with normal S1/S2 without murmurs, rubs or gallops. Abdomen: Soft, epigastric tenderness, non-distended with normal bowel sounds. Musculoskeletal: No clubbing, cyanosis or edema bilaterally. Full range of motion without deformity. Skin: Skin color, texture, turgor normal.  No rashes or lesions. Neurologic:  Neurovascularly intact without any focal sensory/motor deficits. Cranial nerves: II-XII intact, grossly non-focal.  Psychiatric: Alert and oriented, thought content appropriate, normal insight. Some narcotic-seeking behavior. Capillary Refill: Brisk,< 3 seconds   Peripheral Pulses: +2 palpable, equal bilaterally       Labs:   Recent Labs     03/27/21 1710 03/28/21 0440 03/29/21  0548   WBC 17.9* 14.7* 7.9   HGB 15.5 12.7* 13.0*   HCT 45.1 37.2* 38.3*    225 150     Recent Labs     03/27/21 1710 03/28/21  0440 03/29/21  0548   * 130* 132*   K 3.0* 3.2* 3.2*   CL 78* 86* 92*   CO2 22 22 21   BUN 20 12 9   CREATININE 0.9 0.6* 0.6*   CALCIUM 9.3 8.2* 8.8     Recent Labs     03/27/21 1710 03/28/21  0440 03/29/21  0548   * 169* 102*   * 127* 92*   BILIDIR  --   --  <0.2   BILITOT 0.6 0.5 0.5   ALKPHOS 146* 102 95     No results for input(s): INR in the last 72 hours. No results for input(s): [place] in the last 72 hours. Urinalysis:      Lab Results   Component Value Date    NITRU Negative 03/27/2021    WBCUA None seen 03/27/2021    BACTERIA Rare 03/27/2021    RBCUA 5-10 03/27/2021    BLOODU LARGE 03/27/2021    SPECGRAV >=1.030 03/27/2021    GLUCOSEU Negative 03/27/2021       Radiology:  US GALLBLADDER RUQ   Preliminary Result   Diffuse hepatic steatosis. No intrahepatic biliary ductal dilation noted.       Gallbladder appears grossly unremarkable in appearance. Common bile duct is limited but appears slightly distended compared to CT. Please correlate with liver function tests and bilirubin levels. MRCP could   always be considered to further evaluate as clinically indicated. CT ABDOMEN PELVIS W IV CONTRAST Additional Contrast? None   Final Result   Peripancreatic fat stranding, compatible with acute pancreatitis. No evidence   of necrosis is seen at this time. Interval development of a lobulated cystic structure measuring 3.1 cm   associated with the pancreatic tail, which likely reflects formation of a   pseudocyst, but serial surveillance is recommended. Marked mural thickening of the distal esophagus, with surrounding fat   stranding, which is new when compared to the previous exam, concerning for   esophagitis. Again, ulceration is not detected, but that can be difficult to   visualize with CT technique. Consider direct visualization. Severe diffuse hepatic steatosis. CT Head WO Contrast   Final Result   Head CT: No acute intracranial abnormality detected. Facial CT: Bilateral nasal bone fractures with deviation to the left, with   overlying soft tissue swelling. CT FACIAL BONES WO CONTRAST   Final Result   Head CT: No acute intracranial abnormality detected. Facial CT: Bilateral nasal bone fractures with deviation to the left, with   overlying soft tissue swelling. XR CHEST PORTABLE   Final Result   Right lateral 7th rib fracture and adjacent airspace opacity in right lung   probably contusion.                  Assessment/Plan:    Active Hospital Problems    Diagnosis    Acute pancreatitis without infection or necrosis [K85.90]    Elevated liver enzymes [R74.8]    Closed fracture of one rib of right side [S22.31XA]    Pulmonary contusion [S27.329A]    Acute alcohol intoxication ([facility] [address] [F10.929]    Esophagitis [K20.90]       \"Patient presented to the ED via EMS after patient was

## 2021-03-30 VITALS
SYSTOLIC BLOOD PRESSURE: 141 MMHG | DIASTOLIC BLOOD PRESSURE: 102 MMHG | OXYGEN SATURATION: 94 % | HEART RATE: 101 BPM | RESPIRATION RATE: 16 BRPM | TEMPERATURE: 98 F

## 2021-03-30 LAB
ANION GAP SERPL CALCULATED.3IONS-SCNC: 13 MMOL/L (ref 3–16)
BUN BLDV-MCNC: 4 MG/DL (ref 7–20)
CALCIUM SERPL-MCNC: 9.1 MG/DL (ref 8.3–10.6)
CHLORIDE BLD-SCNC: 93 MMOL/L (ref 99–110)
CO2: 29 MMOL/L (ref 21–32)
CREAT SERPL-MCNC: <0.5 MG/DL (ref 0.9–1.3)
GFR AFRICAN AMERICAN: >60
GFR NON-AFRICAN AMERICAN: >60
GLUCOSE BLD-MCNC: 104 MG/DL (ref 70–99)
MAGNESIUM: 2 MG/DL (ref 1.8–2.4)
POTASSIUM REFLEX MAGNESIUM: 2.9 MMOL/L (ref 3.5–5.1)
S PYO THROAT QL CULT: NORMAL
SODIUM BLD-SCNC: 135 MMOL/L (ref 136–145)

## 2021-03-30 PROCEDURE — 6370000000 HC RX 637 (ALT 250 FOR IP): Performed by: INTERNAL MEDICINE

## 2021-03-30 PROCEDURE — 2580000003 HC RX 258: Performed by: INTERNAL MEDICINE

## 2021-03-30 PROCEDURE — 83735 ASSAY OF MAGNESIUM: CPT

## 2021-03-30 PROCEDURE — 36415 COLL VENOUS BLD VENIPUNCTURE: CPT

## 2021-03-30 PROCEDURE — 2580000003 HC RX 258: Performed by: NURSE PRACTITIONER

## 2021-03-30 PROCEDURE — C9113 INJ PANTOPRAZOLE SODIUM, VIA: HCPCS | Performed by: INTERNAL MEDICINE

## 2021-03-30 PROCEDURE — 6360000002 HC RX W HCPCS: Performed by: INTERNAL MEDICINE

## 2021-03-30 PROCEDURE — 80048 BASIC METABOLIC PNL TOTAL CA: CPT

## 2021-03-30 RX ORDER — OXYCODONE HYDROCHLORIDE 5 MG/1
5 TABLET ORAL EVERY 6 HOURS PRN
Qty: 20 TABLET | Refills: 0 | Status: SHIPPED | OUTPATIENT
Start: 2021-03-30 | End: 2021-04-04

## 2021-03-30 RX ORDER — LANOLIN ALCOHOL/MO/W.PET/CERES
100 CREAM (GRAM) TOPICAL DAILY
Qty: 30 TABLET | Refills: 3 | Status: SHIPPED | OUTPATIENT
Start: 2021-03-30

## 2021-03-30 RX ORDER — LISINOPRIL 5 MG/1
15 TABLET ORAL DAILY
Qty: 30 TABLET | Refills: 0
Start: 2021-03-30

## 2021-03-30 RX ORDER — PROPRANOLOL HYDROCHLORIDE 40 MG/1
20 TABLET ORAL 3 TIMES DAILY
Status: DISCONTINUED | OUTPATIENT
Start: 2021-03-30 | End: 2021-03-30 | Stop reason: HOSPADM

## 2021-03-30 RX ORDER — PANTOPRAZOLE SODIUM 40 MG/1
40 TABLET, DELAYED RELEASE ORAL
Qty: 90 TABLET | Refills: 0 | Status: SHIPPED | OUTPATIENT
Start: 2021-03-30

## 2021-03-30 RX ORDER — M-VIT,TX,IRON,MINS/CALC/FOLIC 27MG-0.4MG
1 TABLET ORAL DAILY
Qty: 30 TABLET | Refills: 5 | Status: SHIPPED | OUTPATIENT
Start: 2021-03-31

## 2021-03-30 RX ADMIN — POTASSIUM CHLORIDE 40 MEQ: 1500 TABLET, EXTENDED RELEASE ORAL at 06:53

## 2021-03-30 RX ADMIN — Medication 10 ML: at 07:56

## 2021-03-30 RX ADMIN — CHLORDIAZEPOXIDE HYDROCHLORIDE 25 MG: 25 CAPSULE ORAL at 08:00

## 2021-03-30 RX ADMIN — Medication 1 TABLET: at 08:00

## 2021-03-30 RX ADMIN — SODIUM CHLORIDE, POTASSIUM CHLORIDE, SODIUM LACTATE AND CALCIUM CHLORIDE: 600; 310; 30; 20 INJECTION, SOLUTION INTRAVENOUS at 05:48

## 2021-03-30 RX ADMIN — ENOXAPARIN SODIUM 40 MG: 40 INJECTION SUBCUTANEOUS at 07:55

## 2021-03-30 RX ADMIN — OXYCODONE 10 MG: 5 TABLET ORAL at 01:54

## 2021-03-30 RX ADMIN — OXYCODONE 10 MG: 5 TABLET ORAL at 10:03

## 2021-03-30 RX ADMIN — OXYCODONE 10 MG: 5 TABLET ORAL at 05:48

## 2021-03-30 RX ADMIN — LISINOPRIL 15 MG: 10 TABLET ORAL at 10:39

## 2021-03-30 RX ADMIN — PANTOPRAZOLE SODIUM 40 MG: 40 INJECTION, POWDER, FOR SOLUTION INTRAVENOUS at 07:56

## 2021-03-30 RX ADMIN — PROPRANOLOL HYDROCHLORIDE 20 MG: 40 TABLET ORAL at 10:39

## 2021-03-30 RX ADMIN — NYSTATIN 500000 UNITS: 500000 SUSPENSION ORAL at 08:00

## 2021-03-30 RX ADMIN — THIAMINE HYDROCHLORIDE 100 MG: 100 INJECTION, SOLUTION INTRAMUSCULAR; INTRAVENOUS at 07:59

## 2021-03-30 ASSESSMENT — PAIN SCALES - GENERAL
PAINLEVEL_OUTOF10: 8
PAINLEVEL_OUTOF10: 5

## 2021-03-30 NOTE — CARE COORDINATION
CASE MANAGEMENT DISCHARGE SUMMARY    Discharge to: Home     Transportation: Via family     Confirmed discharge plan with: Patient     RN, name: Halima Mar with Patient reports has active [facility], Writer provided info on obtaining new PCP as reports does not like current. Writer offered supportive recourses ct to decline reports active with AA. Patient verbalized grandfather will  but will need voucher if not able to get in contact with, RN is aware. MARLEY Ortiz      Note: Discharging nurse to complete CHERY, reconcile AVS, and place final copy with patient's discharge packet. RN to ensure that written prescriptions for  Level II medications are sent with patient to the facility as per protocol.

## 2021-03-30 NOTE — PROGRESS NOTES
Discharge: Pt walked off floor by self to be discharged to home. Pt to [facility] on the way out.

## 2021-03-30 NOTE — DISCHARGE SUMMARY
Hospital Medicine Discharge Summary    Patient ID: Christian Rdz      Patient's PCP: No primary care provider on file. Admit Date: 3/27/2021     Discharge Date:   03/30/21     Admitting Physician: Mini Santana MD     Discharge Physician: Marquis Ovi MD     Discharge Diagnoses: Active Hospital Problems    Diagnosis    Acute pancreatitis without infection or necrosis [K85.90]    Elevated liver enzymes [R74.8]    Closed fracture of one rib of right side [S22.31XA]    Pulmonary contusion [S27.329A]    Acute alcohol intoxication ([address] [F10.929]    Esophagitis [K20.90]       The patient was seen and examined on day of discharge and this discharge summary is in conjunction with any daily progress note from day of discharge. Hospital Course:  \"Patient presented to the ED via EMS after patient was found intoxicated by his girlfriend.  Patient reports this week he has been drinking a lot of alcohol. He does remember falling down the stairs yesterday subsequently developed pain on his nose, on arrival to the ER patient was extremely intoxicated with very limited history available.  Patient reports mild abdominal pain and hiccups, no nausea vomiting. \"        Acute alcoholic pancreatitis  - supportive care with IVFs, anagesia, antiemetics, gradual diet advancement. He did well.     Alcohol dependence and withdrawal  - CIWA, PRN lower-dose lorazepam (weaned off), vitamins, encouraged cessation. - noted the alcoholic hepatitis on labs     Alcohol-induced esophagitis  - PPI. Esophagus thick on CT, will need to f/u with GI for outpatient EGD.     R rib fracture, pulmonary contusion, nasal fractures  - supportive care,  ENT f/u as outpatient.     Thrush. Nystatin. Negative strep swab. HTN. Lisinopril 15 qd and propranolol 20 TID per patient.      There is no need for inpatient psychiatry consultation.         Physical Exam Performed:     BP (!) 141/102   Pulse 101   Temp 98 °F (36.7 °C) (Oral)   Resp 16   SpO2 94%       General appearance: No apparent distress, appears stated age and cooperative. HEENT: Pupils equal, round, and reactive to light. Conjunctivae/corneas clear. Resolving thrush. Neck: Supple, with full range of motion. No jugular venous distention. Trachea midline. Respiratory:  Normal respiratory effort. Clear to auscultation, bilaterally without Rales/Wheezes/Rhonchi. Cardiovascular: Regular rate and rhythm with normal S1/S2 without murmurs, rubs or gallops. Abdomen: Soft, epigastric tenderness, non-distended with normal bowel sounds. Musculoskeletal: No clubbing, cyanosis or edema bilaterally. Full range of motion without deformity. Skin: Skin color, texture, turgor normal.  No rashes or lesions. Neurologic:  Neurovascularly intact without any focal sensory/motor deficits. Cranial nerves: II-XII intact, grossly non-focal.  Psychiatric: Alert and oriented, thought content appropriate, normal insight. Frequently asking for narcotics. Capillary Refill: Brisk,< 3 seconds   Peripheral Pulses: +2 palpable, equal bilaterally       Labs: For convenience and continuity at follow-up the following most recent labs are provided:      CBC:    Lab Results   Component Value Date    WBC 7.9 03/29/2021    HGB 13.0 03/29/2021    HCT 38.3 03/29/2021     03/29/2021       Renal:    Lab Results   Component Value Date     03/30/2021    K 2.9 03/30/2021    CL 93 03/30/2021    CO2 29 03/30/2021    BUN 4 03/30/2021    CREATININE <0.5 03/30/2021    CALCIUM 9.1 03/30/2021         Significant Diagnostic Studies    Radiology:   US GALLBLADDER RUQ   Final Result   Diffuse hepatic steatosis. No intrahepatic biliary ductal dilation noted. Gallbladder appears grossly unremarkable in appearance. Common bile duct is limited but appears slightly distended compared to CT. Please correlate with liver function tests and bilirubin levels.   MRCP could   always be considered to further evaluate as clinically indicated. CT ABDOMEN PELVIS W IV CONTRAST Additional Contrast? None   Final Result   Peripancreatic fat stranding, compatible with acute pancreatitis. No evidence   of necrosis is seen at this time. Interval development of a lobulated cystic structure measuring 3.1 cm   associated with the pancreatic tail, which likely reflects formation of a   pseudocyst, but serial surveillance is recommended. Marked mural thickening of the distal esophagus, with surrounding fat   stranding, which is new when compared to the previous exam, concerning for   esophagitis. Again, ulceration is not detected, but that can be difficult to   visualize with CT technique. Consider direct visualization. Severe diffuse hepatic steatosis. CT Head WO Contrast   Final Result   Head CT: No acute intracranial abnormality detected. Facial CT: Bilateral nasal bone fractures with deviation to the left, with   overlying soft tissue swelling. CT FACIAL BONES WO CONTRAST   Final Result   Head CT: No acute intracranial abnormality detected. Facial CT: Bilateral nasal bone fractures with deviation to the left, with   overlying soft tissue swelling. XR CHEST PORTABLE   Final Result   Right lateral 7th rib fracture and adjacent airspace opacity in right lung   probably contusion. Consults:     IP CONSULT TO HOSPITALIST  IP CONSULT TO SOCIAL WORK  IP CONSULT TO SOCIAL WORK    Disposition:  home     Condition at Discharge: Stable    Discharge Instructions/Follow-up:  Follow up with an ENT physician for your broken nose. Follow up with a GI physician for your thickened esophagus on the CT scan (probably related to alcohol, but can't be sure). Follow up with PCP within 1-2 weeks for your blood pressure.     Code Status:  Full Code     Activity: activity as tolerated    Diet: low fat diet      Discharge Medications:     Current Discharge Medication List Details   oxyCODONE (ROXICODONE) 5 MG immediate release tablet Take 1 tablet by mouth every 6 hours as needed for Pain for up to 5 days. Qty: 20 tablet, Refills: 0    Comments: Reduce doses taken as pain becomes manageable  Associated Diagnoses: Alcohol-induced acute pancreatitis, unspecified complication status      Multiple Vitamins-Minerals (THERAPEUTIC MULTIVITAMIN-MINERALS) tablet Take 1 tablet by mouth daily  Qty: 30 tablet, Refills: 5      thiamine 100 MG tablet Take 1 tablet by mouth daily  Qty: 30 tablet, Refills: 3              Details   lisinopril (PRINIVIL;ZESTRIL) 5 MG tablet Take 3 tablets by mouth daily  Qty: 30 tablet, Refills: 0      pantoprazole (PROTONIX) 40 MG tablet Take 1 tablet by mouth every morning (before breakfast)  Qty: 90 tablet, Refills: 0              Details   QUEtiapine (SEROQUEL) 100 MG tablet Take 50 mg by mouth 3 times daily 2-3 times daily      escitalopram (LEXAPRO) 20 MG tablet Take 20 mg by mouth daily      propranolol (INDERAL) 20 MG tablet Take 20 mg by mouth 3 times daily               Time Spent on discharge is more than 30 minutes in the examination, evaluation, counseling and review of medications and discharge plan. Signed:    Alexandra Abrams MD   3/30/2021      Thank you No primary care provider on file. for the opportunity to be involved in this patient's care. If you have any questions or concerns please feel free to contact me at [phone].

## 2021-05-06 ENCOUNTER — APPOINTMENT (OUTPATIENT)
Dept: CT IMAGING | Age: 31
End: 2021-05-06
Payer: COMMERCIAL

## 2021-05-06 ENCOUNTER — HOSPITAL ENCOUNTER (EMERGENCY)
Age: 31
Discharge: HOME | End: 2021-05-06
Attending: STUDENT IN AN ORGANIZED HEALTH CARE EDUCATION/TRAINING PROGRAM
Payer: COMMERCIAL

## 2021-05-06 VITALS
WEIGHT: 208.11 LBS | HEART RATE: 121 BPM | TEMPERATURE: 98.4 F | SYSTOLIC BLOOD PRESSURE: 152 MMHG | OXYGEN SATURATION: 93 % | DIASTOLIC BLOOD PRESSURE: 99 MMHG | BODY MASS INDEX: 29.79 KG/M2 | RESPIRATION RATE: 17 BRPM | HEIGHT: 70 IN

## 2021-05-06 DIAGNOSIS — F10.10 ALCOHOL ABUSE: Primary | ICD-10-CM

## 2021-05-06 LAB
A/G RATIO: 2 (ref 1.1–2.2)
ALBUMIN SERPL-MCNC: 5.3 G/DL (ref 3.4–5)
ALP BLD-CCNC: 126 U/L (ref 40–129)
ALT SERPL-CCNC: 175 U/L (ref 10–40)
ANION GAP SERPL CALCULATED.3IONS-SCNC: 26 MMOL/L (ref 3–16)
AST SERPL-CCNC: 110 U/L (ref 15–37)
BASOPHILS ABSOLUTE: 0.1 K/UL (ref 0–0.2)
BASOPHILS RELATIVE PERCENT: 0.8 %
BILIRUB SERPL-MCNC: 0.5 MG/DL (ref 0–1)
BILIRUBIN URINE: NEGATIVE
BLOOD, URINE: NEGATIVE
BUN BLDV-MCNC: 16 MG/DL (ref 7–20)
CALCIUM SERPL-MCNC: 9.1 MG/DL (ref 8.3–10.6)
CHLORIDE BLD-SCNC: 89 MMOL/L (ref 99–110)
CLARITY: CLEAR
CO2: 20 MMOL/L (ref 21–32)
COLOR: YELLOW
CREAT SERPL-MCNC: 0.8 MG/DL (ref 0.9–1.3)
EOSINOPHILS ABSOLUTE: 0 K/UL (ref 0–0.6)
EOSINOPHILS RELATIVE PERCENT: 0.2 %
EPITHELIAL CELLS, UA: 1 /HPF (ref 0–5)
GFR AFRICAN AMERICAN: >60
GFR NON-AFRICAN AMERICAN: >60
GLOBULIN: 2.6 G/DL
GLUCOSE BLD-MCNC: 101 MG/DL (ref 70–99)
GLUCOSE URINE: NEGATIVE MG/DL
HCT VFR BLD CALC: 43.2 % (ref 40.5–52.5)
HEMOGLOBIN: 14.6 G/DL (ref 13.5–17.5)
HYALINE CASTS: 8 /LPF (ref 0–8)
KETONES, URINE: >=80 MG/DL
LEUKOCYTE ESTERASE, URINE: NEGATIVE
LIPASE: 44 U/L (ref 13–60)
LYMPHOCYTES ABSOLUTE: 1 K/UL (ref 1–5.1)
LYMPHOCYTES RELATIVE PERCENT: 15 %
MCH RBC QN AUTO: 30.8 PG (ref 26–34)
MCHC RBC AUTO-ENTMCNC: 33.9 G/DL (ref 31–36)
MCV RBC AUTO: 90.9 FL (ref 80–100)
MICROSCOPIC EXAMINATION: YES
MONOCYTES ABSOLUTE: 0.4 K/UL (ref 0–1.3)
MONOCYTES RELATIVE PERCENT: 6.6 %
NEUTROPHILS ABSOLUTE: 5.2 K/UL (ref 1.7–7.7)
NEUTROPHILS RELATIVE PERCENT: 77.4 %
NITRITE, URINE: NEGATIVE
PDW BLD-RTO: 14.4 % (ref 12.4–15.4)
PH UA: 6 (ref 5–8)
PLATELET # BLD: 339 K/UL (ref 135–450)
PMV BLD AUTO: 6.6 FL (ref 5–10.5)
POTASSIUM REFLEX MAGNESIUM: 4.2 MMOL/L (ref 3.5–5.1)
PROTEIN UA: 100 MG/DL
RBC # BLD: 4.76 M/UL (ref 4.2–5.9)
RBC UA: 1 /HPF (ref 0–4)
SODIUM BLD-SCNC: 135 MMOL/L (ref 136–145)
SPECIFIC GRAVITY UA: 1.02 (ref 1–1.03)
TOTAL PROTEIN: 7.9 G/DL (ref 6.4–8.2)
URINE REFLEX TO CULTURE: ABNORMAL
URINE TYPE: ABNORMAL
UROBILINOGEN, URINE: 0.2 E.U./DL
WBC # BLD: 6.8 K/UL (ref 4–11)
WBC UA: 2 /HPF (ref 0–5)

## 2021-05-06 PROCEDURE — 6370000000 HC RX 637 (ALT 250 FOR IP): Performed by: STUDENT IN AN ORGANIZED HEALTH CARE EDUCATION/TRAINING PROGRAM

## 2021-05-06 PROCEDURE — 83690 ASSAY OF LIPASE: CPT

## 2021-05-06 PROCEDURE — 74177 CT ABD & PELVIS W/CONTRAST: CPT

## 2021-05-06 PROCEDURE — 6360000002 HC RX W HCPCS: Performed by: STUDENT IN AN ORGANIZED HEALTH CARE EDUCATION/TRAINING PROGRAM

## 2021-05-06 PROCEDURE — 81001 URINALYSIS AUTO W/SCOPE: CPT

## 2021-05-06 PROCEDURE — 6360000004 HC RX CONTRAST MEDICATION: Performed by: STUDENT IN AN ORGANIZED HEALTH CARE EDUCATION/TRAINING PROGRAM

## 2021-05-06 PROCEDURE — 85025 COMPLETE CBC W/AUTO DIFF WBC: CPT

## 2021-05-06 PROCEDURE — 96375 TX/PRO/DX INJ NEW DRUG ADDON: CPT

## 2021-05-06 PROCEDURE — 80053 COMPREHEN METABOLIC PANEL: CPT

## 2021-05-06 PROCEDURE — 2580000003 HC RX 258: Performed by: STUDENT IN AN ORGANIZED HEALTH CARE EDUCATION/TRAINING PROGRAM

## 2021-05-06 PROCEDURE — 2500000003 HC RX 250 WO HCPCS: Performed by: STUDENT IN AN ORGANIZED HEALTH CARE EDUCATION/TRAINING PROGRAM

## 2021-05-06 PROCEDURE — 99284 EMERGENCY DEPT VISIT MOD MDM: CPT

## 2021-05-06 PROCEDURE — C9113 INJ PANTOPRAZOLE SODIUM, VIA: HCPCS | Performed by: STUDENT IN AN ORGANIZED HEALTH CARE EDUCATION/TRAINING PROGRAM

## 2021-05-06 PROCEDURE — 96374 THER/PROPH/DIAG INJ IV PUSH: CPT

## 2021-05-06 RX ORDER — LORAZEPAM 2 MG/ML
2 INJECTION INTRAMUSCULAR ONCE
Status: COMPLETED | OUTPATIENT
Start: 2021-05-06 | End: 2021-05-06

## 2021-05-06 RX ORDER — 0.9 % SODIUM CHLORIDE 0.9 %
1000 INTRAVENOUS SOLUTION INTRAVENOUS ONCE
Status: COMPLETED | OUTPATIENT
Start: 2021-05-06 | End: 2021-05-06

## 2021-05-06 RX ORDER — FENTANYL CITRATE 50 UG/ML
50 INJECTION, SOLUTION INTRAMUSCULAR; INTRAVENOUS ONCE
Status: COMPLETED | OUTPATIENT
Start: 2021-05-06 | End: 2021-05-06

## 2021-05-06 RX ORDER — CHLORDIAZEPOXIDE HYDROCHLORIDE 25 MG/1
25 CAPSULE, GELATIN COATED ORAL ONCE
Status: COMPLETED | OUTPATIENT
Start: 2021-05-06 | End: 2021-05-06

## 2021-05-06 RX ORDER — SODIUM CHLORIDE 9 MG/ML
10 INJECTION INTRAVENOUS DAILY
Status: DISCONTINUED | OUTPATIENT
Start: 2021-05-06 | End: 2021-05-06 | Stop reason: HOSPADM

## 2021-05-06 RX ORDER — ONDANSETRON 2 MG/ML
4 INJECTION INTRAMUSCULAR; INTRAVENOUS ONCE
Status: COMPLETED | OUTPATIENT
Start: 2021-05-06 | End: 2021-05-06

## 2021-05-06 RX ORDER — OXYCODONE HYDROCHLORIDE AND ACETAMINOPHEN 5; 325 MG/1; MG/1
1 TABLET ORAL ONCE
Status: COMPLETED | OUTPATIENT
Start: 2021-05-06 | End: 2021-05-06

## 2021-05-06 RX ORDER — PANTOPRAZOLE SODIUM 40 MG/10ML
40 INJECTION, POWDER, LYOPHILIZED, FOR SOLUTION INTRAVENOUS DAILY
Status: DISCONTINUED | OUTPATIENT
Start: 2021-05-06 | End: 2021-05-06 | Stop reason: HOSPADM

## 2021-05-06 RX ADMIN — SODIUM CHLORIDE 10 ML: 9 INJECTION INTRAMUSCULAR; INTRAVENOUS; SUBCUTANEOUS at 08:40

## 2021-05-06 RX ADMIN — CHLORDIAZEPOXIDE HYDROCHLORIDE 25 MG: 25 CAPSULE ORAL at 12:19

## 2021-05-06 RX ADMIN — FAMOTIDINE 20 MG: 10 INJECTION, SOLUTION INTRAVENOUS at 08:37

## 2021-05-06 RX ADMIN — MAGNESIUM HYDROXIDE/ALUMINUM HYDROXICE/SIMETHICONE: 120; 1200; 1200 SUSPENSION ORAL at 08:35

## 2021-05-06 RX ADMIN — ONDANSETRON 4 MG: 2 INJECTION INTRAMUSCULAR; INTRAVENOUS at 08:36

## 2021-05-06 RX ADMIN — SODIUM CHLORIDE 1000 ML: 9 INJECTION, SOLUTION INTRAVENOUS at 08:41

## 2021-05-06 RX ADMIN — OXYCODONE HYDROCHLORIDE AND ACETAMINOPHEN 1 TABLET: 5; 325 TABLET ORAL at 10:57

## 2021-05-06 RX ADMIN — FENTANYL CITRATE 50 MCG: 50 INJECTION, SOLUTION INTRAMUSCULAR; INTRAVENOUS at 08:38

## 2021-05-06 RX ADMIN — SODIUM CHLORIDE 1000 ML: 9 INJECTION, SOLUTION INTRAVENOUS at 10:23

## 2021-05-06 RX ADMIN — CHLORDIAZEPOXIDE HYDROCHLORIDE 25 MG: 25 CAPSULE ORAL at 10:34

## 2021-05-06 RX ADMIN — IOPAMIDOL 75 ML: 755 INJECTION, SOLUTION INTRAVENOUS at 09:21

## 2021-05-06 RX ADMIN — LORAZEPAM 2 MG: 2 INJECTION INTRAMUSCULAR; INTRAVENOUS at 11:47

## 2021-05-06 RX ADMIN — PANTOPRAZOLE SODIUM 40 MG: 40 INJECTION, POWDER, FOR SOLUTION INTRAVENOUS at 08:40

## 2021-05-06 ASSESSMENT — PAIN SCALES - GENERAL
PAINLEVEL_OUTOF10: 8
PAINLEVEL_OUTOF10: 8
PAINLEVEL_OUTOF10: 7
PAINLEVEL_OUTOF10: 4
PAINLEVEL_OUTOF10: 8

## 2021-05-06 ASSESSMENT — PAIN DESCRIPTION - LOCATION
LOCATION: ABDOMEN
LOCATION: ABDOMEN

## 2021-05-06 ASSESSMENT — PAIN DESCRIPTION - FREQUENCY: FREQUENCY: CONTINUOUS

## 2021-05-06 ASSESSMENT — PAIN DESCRIPTION - PROGRESSION: CLINICAL_PROGRESSION: GRADUALLY WORSENING

## 2021-05-06 ASSESSMENT — PAIN DESCRIPTION - DESCRIPTORS: DESCRIPTORS: ACHING

## 2021-05-06 ASSESSMENT — PAIN DESCRIPTION - PAIN TYPE
TYPE: ACUTE PAIN
TYPE: ACUTE PAIN

## 2021-05-06 NOTE — ED NOTES
Pt going to a inpatient rehab and detox facility following discharge.      Lisa Hadley RN  05/06/21 [id number]

## 2021-05-06 NOTE — ED PROVIDER NOTES
Primary Care Physician: Kiel Salinas MD   Attending Physician: Dilma Gil MD     History   Chief Complaint   Patient presents with    Pancreatitis     Patient c/o right side abdominal pain and vomiting. History of pancreatitis due to alcohol. Reports that he has been drinking alcohol over the last week. MARY CARMEN Cowan is a 27 y.o. male history of acid reflux, pancreatitis from alcohol abuse presenting this morning complaining of epigastric pain which radiates to the right upper quadrant. Patient stated pain started way after drinking heavily. He said that he has been drinking for the past week. He stated he was drinking because he was having a good time. Associated symptoms include nausea without vomiting. Denies any blood in the stool. No fevers chills chest pain or shortness of breath. .    Past Medical History:   Diagnosis Date    GERD (gastroesophageal reflux disease)         Past Surgical History:   Procedure Laterality Date    SHOULDER SURGERY Right         History reviewed. No pertinent family history. Social History     Socioeconomic History    Marital status: Single     Spouse name: None    Number of children: None    Years of education: None    Highest education level: None   Occupational History    None   Social Needs    Financial resource strain: None    Food insecurity     Worry: None     Inability: None    Transportation needs     Medical: None     Non-medical: None   Tobacco Use    Smoking status: Never Smoker    Smokeless tobacco: Never Used   Substance and Sexual Activity    Alcohol use:  Yes    Drug use: Never    Sexual activity: None   Lifestyle    Physical activity     Days per week: None     Minutes per session: None    Stress: None   Relationships    Social connections     Talks on phone: None     Gets together: None     Attends Hoahaoism service: None     Active member of club or organization: None     Attends meetings of clubs or organizations: None     Relationship status: None    Intimate partner violence     Fear of current or ex partner: None     Emotionally abused: None     Physically abused: None     Forced sexual activity: None   Other Topics Concern    None   Social History Narrative    None        Review of Systems   10 total systems reviewed and found to be negative unless otherwise noted in HPI     Physical Exam   BP (!) 141/89   Pulse 108   Temp 98.4 °F (36.9 °C) (Oral)   Resp 12   Ht 5' 10\" (1.778 m)   Wt 208 lb 1.8 oz (94.4 kg)   SpO2 93%   BMI 29.86 kg/m²      CONSTITUTIONAL: Well appearing, in no acute distress   HEAD: atraumatic, normocephalic   EYES: PERRL, No injection, discharge or scleral icterus. ENT: Moist mucous membranes. NECK: Normal ROM, NO LAD   CARDIOVASCULAR: Tachycardic in the 110. No murmurs or gallop. PULMONARY/CHEST: Airway patent. No retractions. Breath sounds clear with good air entry bilaterally. ABDOMEN: Soft, Non-distended and non-tender, without guarding or rebound. SKIN: Acyanotic, warm, dry   MUSCULOSKELETAL: No swelling, tenderness or deformity   NEUROLOGICAL: Awake and oriented x 3. Pulses intact. Grossly nonfocal   Nursing note and vitals reviewed.      ED Course & Medical Decision Making   Medications   pantoprazole (PROTONIX) injection 40 mg (40 mg Intravenous Given 5/6/21 0840)     And   sodium chloride (PF) 0.9 % injection 10 mL (10 mLs Intravenous Given 5/6/21 0840)   chlordiazePOXIDE (LIBRIUM) capsule 25 mg (has no administration in time range)   0.9 % sodium chloride bolus (0 mLs Intravenous Stopped 5/6/21 1057)   fentaNYL (SUBLIMAZE) injection 50 mcg (50 mcg Intravenous Given 5/6/21 0838)   ondansetron (ZOFRAN) injection 4 mg (4 mg Intravenous Given 5/6/21 0836)   aluminum & magnesium hydroxide-simethicone (MAALOX) 30 mL, lidocaine viscous hcl (XYLOCAINE) 5 mL (GI COCKTAIL) ( Oral Given 5/6/21 0835)   famotidine (PEPCID) injection 20 mg (20 mg Intravenous Given 5/6/21 7216)   0.9 % sodium chloride bolus (1,000 mLs Intravenous New Bag 5/6/21 1023)   iopamidol (ISOVUE-370) 76 % injection 75 mL (75 mLs Intravenous Given 5/6/21 0921)   chlordiazePOXIDE (LIBRIUM) capsule 25 mg (25 mg Oral Given 5/6/21 1034)   oxyCODONE-acetaminophen (PERCOCET) 5-325 MG per tablet 1 tablet (1 tablet Oral Given 5/6/21 1057)   LORazepam (ATIVAN) injection 2 mg (2 mg Intravenous Given 5/6/21 1147)      Labs Reviewed   COMPREHENSIVE METABOLIC PANEL W/ REFLEX TO MG FOR LOW K - Abnormal; Notable for the following components:       Result Value    Sodium 135 (*)     Chloride 89 (*)     CO2 20 (*)     Anion Gap 26 (*)     Glucose 101 (*)     CREATININE 0.8 (*)     Albumin 5.3 (*)      (*)      (*)     All other components within normal limits    Narrative:     Performed at:  [facility]  [address] [employer]   Phone [phone]   URINE RT REFLEX TO CULTURE - Abnormal; Notable for the following components:    Ketones, Urine >=80 (*)     Protein,  (*)     All other components within normal limits    Narrative:     Performed at:  [facility]  [address] [address] [facility] [employer]   Phone [phone]   CBC WITH AUTO DIFFERENTIAL    Narrative:     Performed at:  [facility]  [address] [facility] [employer]   Phone [phone]   LIPASE    Narrative:     Performed at:  [facility]  [address] [place] [address] [employer]   Phone [phone]   MICROSCOPIC URINALYSIS    Narrative:     Performed at:  [facility]  [address] [place] [address] [employer]   Phone [phone]      CT ABDOMEN PELVIS W IV CONTRAST Additional Contrast? None   Final Result   No acute intra-abdominal or intrapelvic abnormality.       Small cystic structure is again seen in thepancreatic tail, likely a small pseudocyst.  This is slightly decreased in size compared to prior. Marked hepatic steatosis. Mild wall thickening involving the distal esophagus, slightly decreased   compared to prior. Findings may be related to esophagitis. Consider direct   visualization if indicated. PROCEDURES:   Procedures    ASSESSMENT AND PLAN:  Herbert Dumont is a 27 y.o. male presenting with abdominal pain after drinking heavily for the past week. On exam patient appears nontoxic in no acute distress but tender to palpation right upper quadrant. He was also tachycardic in the 110s. Given his presentation I did obtain labs including lipase and his CT scan results were unremarkable and improvement in the pancreatic cyst.  Which I believe secondary to chronic pancreatitis. Patient was given 2 L of IV fluid, as well as Librium x225 mg and 2 mg of Ativan. He has been accepted at the detox center. At this time he is stable I think that he can be discharged for further evaluation and treatment and detox center. At this time I do not believe that patient is withdrawing but has the potential to withdrawal.    ClINICAL IMPRESSION:  1. Alcohol abuse          PATIENT REFERRED TO:  Dora Jhaveri MD  [facility]    Schedule an appointment as soon as possible for a visit in 2 days      DISCHARGE MEDICATIONS:  New Prescriptions    No medications on file     DISCONTINUED MEDICATIONS:  Discontinued Medications    No medications on file     DISPOSITION    Discharge  -We have instructed the patient, Herbert Dumont) to return to the ED or call him PCP if his pain/symptoms worsen. -Findings and recommendations explained to patient. He expressed understanding and agreed with the plan.   Leoncio Christie MD (electronically